# Patient Record
Sex: FEMALE | Race: WHITE | NOT HISPANIC OR LATINO | Employment: OTHER
[De-identification: names, ages, dates, MRNs, and addresses within clinical notes are randomized per-mention and may not be internally consistent; named-entity substitution may affect disease eponyms.]

---

## 2017-01-27 DIAGNOSIS — E78.5 HYPERLIPIDEMIA LDL GOAL <100: Primary | ICD-10-CM

## 2017-01-27 NOTE — TELEPHONE ENCOUNTER
Atorvastatin         Last Written Prescription Date: 10/27/16  Last Fill Quantity: 90, # refills: 0    Last Office Visit with G, P or [facility] prescribing provider:  08/02/16   Future Office Visit:      BP Readings from Last 3 Encounters:   10/04/16 124/67   08/19/16 135/82   08/02/16 138/73     ALT       19   8/8/2013  CHOL      203   8/2/2016  HDL       54   8/2/2016  LDL      107   8/2/2016  TRIG      209   8/2/2016  CHOLHDLRATIO      3.8   8/7/2015

## 2017-01-30 RX ORDER — ATORVASTATIN CALCIUM 40 MG/1
40 TABLET, FILM COATED ORAL DAILY
Qty: 90 TABLET | Refills: 0 | Status: SHIPPED | OUTPATIENT
Start: 2017-01-30 | End: 2022-03-24

## 2017-01-30 NOTE — TELEPHONE ENCOUNTER
Read by Lina Hendrix at 8/20/2016  9:24 AM      [place]   Thyroid is normal, lipids too high. Recheck in 2 months after starting lipitor or crestor.   If you have question, please send me a [employer] message or call my care team [phone].  Thanks, YESSY STOREY MD.     Routing refill request to provider for review/approval because:  Labs out of range:  LDL    Thank you  Eva GRACIA RN

## 2017-09-16 ENCOUNTER — HEALTH MAINTENANCE LETTER (OUTPATIENT)
Age: 82
End: 2017-09-16

## 2019-11-07 ENCOUNTER — HEALTH MAINTENANCE LETTER (OUTPATIENT)
Age: 84
End: 2019-11-07

## 2020-02-17 ENCOUNTER — HEALTH MAINTENANCE LETTER (OUTPATIENT)
Age: 85
End: 2020-02-17

## 2020-11-29 ENCOUNTER — HEALTH MAINTENANCE LETTER (OUTPATIENT)
Age: 85
End: 2020-11-29

## 2021-04-10 ENCOUNTER — HEALTH MAINTENANCE LETTER (OUTPATIENT)
Age: 86
End: 2021-04-10

## 2021-05-24 ENCOUNTER — TRANSFERRED RECORDS (OUTPATIENT)
Dept: HEALTH INFORMATION MANAGEMENT | Facility: CLINIC | Age: 86
End: 2021-05-24
Payer: MEDICARE

## 2021-09-25 ENCOUNTER — HEALTH MAINTENANCE LETTER (OUTPATIENT)
Age: 86
End: 2021-09-25

## 2021-09-28 ENCOUNTER — TRANSFERRED RECORDS (OUTPATIENT)
Dept: HEALTH INFORMATION MANAGEMENT | Facility: CLINIC | Age: 86
End: 2021-09-28
Payer: MEDICARE

## 2021-10-05 ENCOUNTER — TRANSFERRED RECORDS (OUTPATIENT)
Dept: HEALTH INFORMATION MANAGEMENT | Facility: CLINIC | Age: 86
End: 2021-10-05
Payer: MEDICARE

## 2022-03-24 ENCOUNTER — OFFICE VISIT (OUTPATIENT)
Dept: FAMILY MEDICINE | Facility: CLINIC | Age: 87
End: 2022-03-24
Payer: COMMERCIAL

## 2022-03-24 VITALS
HEART RATE: 75 BPM | HEIGHT: 66 IN | DIASTOLIC BLOOD PRESSURE: 86 MMHG | BODY MASS INDEX: 29.41 KG/M2 | WEIGHT: 183 LBS | OXYGEN SATURATION: 98 % | SYSTOLIC BLOOD PRESSURE: 120 MMHG | RESPIRATION RATE: 16 BRPM | TEMPERATURE: 97.2 F

## 2022-03-24 DIAGNOSIS — I10 ESSENTIAL HYPERTENSION WITH GOAL BLOOD PRESSURE LESS THAN 140/90: ICD-10-CM

## 2022-03-24 DIAGNOSIS — N18.31 STAGE 3A CHRONIC KIDNEY DISEASE (H): ICD-10-CM

## 2022-03-24 DIAGNOSIS — Z12.11 SCREEN FOR COLON CANCER: ICD-10-CM

## 2022-03-24 DIAGNOSIS — E03.9 ACQUIRED HYPOTHYROIDISM: ICD-10-CM

## 2022-03-24 DIAGNOSIS — E78.5 HYPERLIPIDEMIA LDL GOAL <100: ICD-10-CM

## 2022-03-24 DIAGNOSIS — F51.02 ADJUSTMENT INSOMNIA: Primary | ICD-10-CM

## 2022-03-24 PROCEDURE — 99204 OFFICE O/P NEW MOD 45 MIN: CPT | Performed by: FAMILY MEDICINE

## 2022-03-24 RX ORDER — LEVOTHYROXINE SODIUM 88 UG/1
88 TABLET ORAL DAILY
Qty: 90 TABLET | Refills: 4 | Status: SHIPPED | OUTPATIENT
Start: 2022-03-24 | End: 2022-11-04

## 2022-03-24 RX ORDER — TRAZODONE HYDROCHLORIDE 50 MG/1
50-100 TABLET, FILM COATED ORAL
Qty: 90 TABLET | Status: SHIPPED | OUTPATIENT
Start: 2022-03-24 | End: 2022-11-04

## 2022-03-24 RX ORDER — HYDROCHLOROTHIAZIDE 25 MG/1
25 TABLET ORAL EVERY MORNING
Qty: 90 TABLET | Refills: 4 | Status: SHIPPED | OUTPATIENT
Start: 2022-03-24 | End: 2022-11-04

## 2022-03-24 RX ORDER — ATORVASTATIN CALCIUM 40 MG/1
40 TABLET, FILM COATED ORAL DAILY
Qty: 90 TABLET | Refills: 4 | Status: SHIPPED | OUTPATIENT
Start: 2022-03-24 | End: 2022-11-04

## 2022-03-24 RX ORDER — ATENOLOL 25 MG/1
25 TABLET ORAL DAILY
Qty: 90 TABLET | Refills: 4 | Status: SHIPPED | OUTPATIENT
Start: 2022-03-24 | End: 2022-11-04

## 2022-03-24 ASSESSMENT — PATIENT HEALTH QUESTIONNAIRE - PHQ9
SUM OF ALL RESPONSES TO PHQ QUESTIONS 1-9: 11
SUM OF ALL RESPONSES TO PHQ QUESTIONS 1-9: 11
10. IF YOU CHECKED OFF ANY PROBLEMS, HOW DIFFICULT HAVE THESE PROBLEMS MADE IT FOR YOU TO DO YOUR WORK, TAKE CARE OF THINGS AT HOME, OR GET ALONG WITH OTHER PEOPLE: SOMEWHAT DIFFICULT

## 2022-03-24 ASSESSMENT — ANXIETY QUESTIONNAIRES
5. BEING SO RESTLESS THAT IT IS HARD TO SIT STILL: SEVERAL DAYS
3. WORRYING TOO MUCH ABOUT DIFFERENT THINGS: MORE THAN HALF THE DAYS
7. FEELING AFRAID AS IF SOMETHING AWFUL MIGHT HAPPEN: NOT AT ALL
7. FEELING AFRAID AS IF SOMETHING AWFUL MIGHT HAPPEN: NOT AT ALL
GAD7 TOTAL SCORE: 10
4. TROUBLE RELAXING: MORE THAN HALF THE DAYS
GAD7 TOTAL SCORE: 10
1. FEELING NERVOUS, ANXIOUS, OR ON EDGE: MORE THAN HALF THE DAYS
2. NOT BEING ABLE TO STOP OR CONTROL WORRYING: MORE THAN HALF THE DAYS
GAD7 TOTAL SCORE: 10
6. BECOMING EASILY ANNOYED OR IRRITABLE: SEVERAL DAYS

## 2022-03-24 ASSESSMENT — PAIN SCALES - GENERAL: PAINLEVEL: NO PAIN (0)

## 2022-03-24 NOTE — NURSING NOTE
"Initial /86   Pulse 75   Temp 97.2  F (36.2  C) (Tympanic)   Resp 16   Ht 1.664 m (5' 5.5\")   Wt 83 kg (183 lb)   SpO2 98%   BMI 29.99 kg/m   Estimated body mass index is 29.99 kg/m  as calculated from the following:    Height as of this encounter: 1.664 m (5' 5.5\").    Weight as of this encounter: 83 kg (183 lb). .      "

## 2022-03-24 NOTE — PROGRESS NOTES
"  Assessment & Plan     Adjustment insomnia  Exacerbated by the sudden death of her daughter due to a brain aneurysm.  She had been using Xanax prescribed by a [place] doctor but she would prefer not to.  Several years ago she was on trazodone and that worked well for her she is wondering about going back to this.  I think is a very reasonable option.  - traZODone (DESYREL) 50 MG tablet; Take 1-2 tablets ( mg) by mouth nightly as needed for sleep    Hypothyroidism  Well controlled. Refilled medication.   Labs up-to-date.  She will bring in her labs from [place] so that we can transfer those to her chart.  - levothyroxine (SYNTHROID/LEVOTHROID) 88 MCG tablet; Take 1 tablet (88 mcg) by mouth daily    Essential hypertension with goal blood pressure less than 140/90  Well controlled. Refilled medication.   Labs up-to-date.  She will bring in her labs from [place] so that we can transfer those to her chart.  - hydrochlorothiazide (HYDRODIURIL) 25 MG tablet; Take 1 tablet (25 mg) by mouth every morning  - atenolol (TENORMIN) 25 MG tablet; Take 1 tablet (25 mg) by mouth daily    Hyperlipidemia  Well controlled. Refilled medication.   Labs up-to-date.  She will bring in her labs from [place] so that we can transfer those to her chart.  - atorvastatin (LIPITOR) 40 MG tablet; Take 1 tablet (40 mg) by mouth daily    Stage 3a chronic kidney disease (H)  Monitor labs.    Screen for colon cancer               BMI:   Estimated body mass index is 29.99 kg/m  as calculated from the following:    Height as of this encounter: 1.664 m (5' 5.5\").    Weight as of this encounter: 83 kg (183 lb).           Return in about 1 year (around 3/24/2023) for Physical.    Adrienne Tejeda MD  [facility]    Amara Mills is a 87 year old who presents for the following health issues     History of Present Illness       CKD: She is not using over the counter pain medicine.     Mental Health Follow-up:  " "Patient presents to follow-up on Anxiety.    Patient's anxiety since last visit has been:  No change  The patient is having other symptoms associated with anxiety.  Any significant life events: grief or loss  Patient is feeling anxious or having panic attacks.  Patient has no concerns about alcohol or drug use.       Today's PHQ-9         PHQ-9 Total Score: 11  PHQ-9 Q9 Thoughts of better off dead/self-harm past 2 weeks :   (P) Not at all    How difficult have these problems made it for you to do your work, take care of things at home, or get along with other people: Somewhat difficult    Today's JEAN MARIE-7 Score: 10    Hyperlipidemia:  She presents for follow up of hyperlipidemia.  She is taking medication to lower cholesterol. She is not having myalgia or other side effects to statin medications.    Hypertension: She presents for follow up of hypertension.  She does check blood pressure  regularly outside of the clinic. Outpatient blood pressures have not been over 140/90. She follows a low salt diet.     Hypothyroidism:     Since last visit, patient describes the following symptoms::  Anxiety and Constipation    She eats 4 or more servings of fruits and vegetables daily.She consumes 0 sweetened beverage(s) daily.She exercises with enough effort to increase her heart rate 30 to 60 minutes per day.  She exercises with enough effort to increase her heart rate 6 days per week.   She is taking medications regularly.             Review of Systems   Constitutional, neuro, ENT, endocrine, pulmonary, cardiac, gastrointestinal, genitourinary, musculoskeletal, integument and psychiatric systems are negative, except as otherwise noted.       Objective    /86   Pulse 75   Temp 97.2  F (36.2  C) (Tympanic)   Resp 16   Ht 1.664 m (5' 5.5\")   Wt 83 kg (183 lb)   SpO2 98%   BMI 29.99 kg/m    Body mass index is 29.99 kg/m .  Physical Exam   GENERAL: Pleasant, well appearing female.            "

## 2022-03-25 ASSESSMENT — PATIENT HEALTH QUESTIONNAIRE - PHQ9: SUM OF ALL RESPONSES TO PHQ QUESTIONS 1-9: 11

## 2022-03-25 ASSESSMENT — ANXIETY QUESTIONNAIRES: GAD7 TOTAL SCORE: 10

## 2022-04-04 ENCOUNTER — DOCUMENTATION ONLY (OUTPATIENT)
Dept: OTHER | Facility: CLINIC | Age: 87
End: 2022-04-04
Payer: COMMERCIAL

## 2022-05-01 ENCOUNTER — HEALTH MAINTENANCE LETTER (OUTPATIENT)
Age: 87
End: 2022-05-01

## 2022-08-03 ENCOUNTER — HOSPITAL ENCOUNTER (EMERGENCY)
Facility: CLINIC | Age: 87
Discharge: HOME | End: 2022-08-03
Attending: PHYSICIAN ASSISTANT | Admitting: PHYSICIAN ASSISTANT
Payer: COMMERCIAL

## 2022-08-03 VITALS
TEMPERATURE: 99.3 F | WEIGHT: 180 LBS | SYSTOLIC BLOOD PRESSURE: 156 MMHG | RESPIRATION RATE: 18 BRPM | OXYGEN SATURATION: 96 % | HEART RATE: 80 BPM | DIASTOLIC BLOOD PRESSURE: 88 MMHG | BODY MASS INDEX: 29.5 KG/M2

## 2022-08-03 DIAGNOSIS — R10.9 RIGHT FLANK PAIN: ICD-10-CM

## 2022-08-03 DIAGNOSIS — R35.0 URINARY FREQUENCY: ICD-10-CM

## 2022-08-03 DIAGNOSIS — R82.90 ABNORMAL FINDING ON URINALYSIS: ICD-10-CM

## 2022-08-03 DIAGNOSIS — Z87.448 HISTORY OF KIDNEY DISEASE: ICD-10-CM

## 2022-08-03 LAB
ALBUMIN SERPL-MCNC: 4.2 G/DL (ref 3.4–5)
ALBUMIN UR-MCNC: NEGATIVE MG/DL
ALP SERPL-CCNC: 67 U/L (ref 40–150)
ALT SERPL W P-5'-P-CCNC: 22 U/L (ref 0–50)
ANION GAP SERPL CALCULATED.3IONS-SCNC: 6 MMOL/L (ref 3–14)
APPEARANCE UR: CLEAR
AST SERPL W P-5'-P-CCNC: 17 U/L (ref 0–45)
BASOPHILS # BLD AUTO: 0 10E3/UL (ref 0–0.2)
BASOPHILS NFR BLD AUTO: 1 %
BILIRUB SERPL-MCNC: 0.4 MG/DL (ref 0.2–1.3)
BILIRUB UR QL STRIP: NEGATIVE
BUN SERPL-MCNC: 19 MG/DL (ref 7–30)
CALCIUM SERPL-MCNC: 9.2 MG/DL (ref 8.5–10.1)
CHLORIDE BLD-SCNC: 101 MMOL/L (ref 94–109)
CO2 SERPL-SCNC: 27 MMOL/L (ref 20–32)
COLOR UR AUTO: ABNORMAL
CREAT SERPL-MCNC: 0.94 MG/DL (ref 0.52–1.04)
EOSINOPHIL # BLD AUTO: 0.2 10E3/UL (ref 0–0.7)
EOSINOPHIL NFR BLD AUTO: 3 %
ERYTHROCYTE [DISTWIDTH] IN BLOOD BY AUTOMATED COUNT: 12.4 % (ref 10–15)
GFR SERPL CREATININE-BSD FRML MDRD: 59 ML/MIN/1.73M2
GLUCOSE BLD-MCNC: 104 MG/DL (ref 70–99)
GLUCOSE UR STRIP-MCNC: NEGATIVE MG/DL
HCT VFR BLD AUTO: 37.5 % (ref 35–47)
HGB BLD-MCNC: 13 G/DL (ref 11.7–15.7)
HGB UR QL STRIP: ABNORMAL
HOLD SPECIMEN: NORMAL
HOLD SPECIMEN: NORMAL
IMM GRANULOCYTES # BLD: 0 10E3/UL
IMM GRANULOCYTES NFR BLD: 0 %
KETONES UR STRIP-MCNC: NEGATIVE MG/DL
LEUKOCYTE ESTERASE UR QL STRIP: NEGATIVE
LYMPHOCYTES # BLD AUTO: 1.4 10E3/UL (ref 0.8–5.3)
LYMPHOCYTES NFR BLD AUTO: 18 %
MCH RBC QN AUTO: 31.3 PG (ref 26.5–33)
MCHC RBC AUTO-ENTMCNC: 34.7 G/DL (ref 31.5–36.5)
MCV RBC AUTO: 90 FL (ref 78–100)
MONOCYTES # BLD AUTO: 0.6 10E3/UL (ref 0–1.3)
MONOCYTES NFR BLD AUTO: 8 %
MUCOUS THREADS #/AREA URNS LPF: PRESENT /LPF
NEUTROPHILS # BLD AUTO: 5.6 10E3/UL (ref 1.6–8.3)
NEUTROPHILS NFR BLD AUTO: 70 %
NITRATE UR QL: NEGATIVE
NRBC # BLD AUTO: 0 10E3/UL
NRBC BLD AUTO-RTO: 0 /100
PH UR STRIP: 6 [PH] (ref 5–7)
PLATELET # BLD AUTO: 254 10E3/UL (ref 150–450)
POTASSIUM BLD-SCNC: 3.8 MMOL/L (ref 3.4–5.3)
PROT SERPL-MCNC: 7.3 G/DL (ref 6.8–8.8)
RBC # BLD AUTO: 4.16 10E6/UL (ref 3.8–5.2)
RBC URINE: 1 /HPF
SODIUM SERPL-SCNC: 134 MMOL/L (ref 133–144)
SP GR UR STRIP: 1.01 (ref 1–1.03)
SQUAMOUS EPITHELIAL: <1 /HPF
UROBILINOGEN UR STRIP-MCNC: NORMAL MG/DL
WBC # BLD AUTO: 7.9 10E3/UL (ref 4–11)
WBC URINE: 7 /HPF

## 2022-08-03 PROCEDURE — G0463 HOSPITAL OUTPT CLINIC VISIT: HCPCS | Performed by: PHYSICIAN ASSISTANT

## 2022-08-03 PROCEDURE — 36415 COLL VENOUS BLD VENIPUNCTURE: CPT | Performed by: PHYSICIAN ASSISTANT

## 2022-08-03 PROCEDURE — 87086 URINE CULTURE/COLONY COUNT: CPT | Performed by: PHYSICIAN ASSISTANT

## 2022-08-03 PROCEDURE — 81001 URINALYSIS AUTO W/SCOPE: CPT | Performed by: PHYSICIAN ASSISTANT

## 2022-08-03 PROCEDURE — 85014 HEMATOCRIT: CPT | Performed by: PHYSICIAN ASSISTANT

## 2022-08-03 PROCEDURE — 99214 OFFICE O/P EST MOD 30 MIN: CPT | Performed by: PHYSICIAN ASSISTANT

## 2022-08-03 PROCEDURE — 80053 COMPREHEN METABOLIC PANEL: CPT | Performed by: PHYSICIAN ASSISTANT

## 2022-08-03 RX ORDER — CEFDINIR 300 MG/1
300 CAPSULE ORAL 2 TIMES DAILY
Qty: 14 CAPSULE | Refills: 0 | Status: SHIPPED | OUTPATIENT
Start: 2022-08-03 | End: 2022-08-10

## 2022-08-03 ASSESSMENT — ENCOUNTER SYMPTOMS
RESPIRATORY NEGATIVE: 1
NECK PAIN: 0
FREQUENCY: 1
FLANK PAIN: 1
VOMITING: 0
DIARRHEA: 0
FATIGUE: 0
CARDIOVASCULAR NEGATIVE: 1
NEUROLOGICAL NEGATIVE: 1
FEVER: 0
NAUSEA: 0
NECK STIFFNESS: 0
CONSTITUTIONAL NEGATIVE: 1
BLOOD IN STOOL: 0

## 2022-08-03 NOTE — DISCHARGE INSTRUCTIONS
Increase fluids rest, Tylenol over-the-counter as needed for symptoms.      Antibiotic to use as directed for the next 7 days.    Culture sent and currently pending.    Discussed options of outpatient imaging versus following up with the emergency department if symptoms persist or fail to improve in the next couple of days with inpatient labs and possible imaging if needed for further evaluation management if the initial antibiotic treatment does not work.    Return to the emergency department if persistent pain, chills, confusion, fevers, blood in the urine, worsening abdominal pain, change or worsening of symptoms occur or no improvement of symptoms in the next 2 days.    If symptoms are improving recommend recheck with primary care doctor in 5-7 days.

## 2022-08-03 NOTE — ED PROVIDER NOTES
History     Chief Complaint   Patient presents with     Back Pain     Pt is having right lower back pain that radiates.it started yesterday. Unable to sleep because of the pain. Pt has hx of having cysts on kidneys. Pt also reports urinary frequency. s     KAREN Hendrix is a 87 year old female with history of hypertension, stage IIIa chronic kidney disease who presents today with right back/flank pain that wraps around to the groin. Patient states symptoms started yesterday with urinary frequency and urgency today.  She states she was exposed she had a kidney cyst on image but that it was benign and they were just following treatments and that this may be related to symptoms today.  She denies any injury, falls, trauma dysuria.  She states pain is worse with movement.  She denies any rash, fevers, chills, shortness of breath, chest pain, heart racing or skipping beats, hematuria, dysuria, bloody or black tarry stools, or abdominal bloating.  She states she had a normal bowel movement today.  She has been eating and drinking normally.  She has been taking Tylenol with no improvement of symptoms.     Allergies:  Allergies   Allergen Reactions     Bee Venom        Problem List:    Patient Active Problem List    Diagnosis Date Noted     Essential hypertension with goal blood pressure less than 140/90 01/02/2006     Priority: High     switched from Timolol to Atenolol 1/2/06  Borderline GFR [place] filtration rate of kidneys   Letter from [facility] re screening - see scanned copy GFR [place] filtration rate of kidneys of 60 creat 0.93 identifying her as chronic kidney disease stage 2 - will cont to monitor for change  Problem list name updated by automated process. Provider to review       Stage 3a chronic kidney disease (H) 03/24/2022     Priority: Medium     Adjustment insomnia 03/24/2022     Priority: Medium     Advance care planning 08/18/2014     Priority: Medium     Advance  Care Planning:   Receipt of ACP document:  Received: Health Care Directive which was witnessed or notarized on 11/29/2013.  Document not previously scanned.  Validation form completed and sent with document to be scanned.  Code Status reflects choices in most recent ACP document.  Confirmed/documented designated decision maker(s). See permanent comments section of demographics in clinical tab. View document(s) and details by clicking on code status.   Added by Toshia Pool on 8/18/2014.             Osteopenia 08/08/2013     Priority: Medium     History of anxiety 01/31/2013     Priority: Medium     SK (seborrheic keratosis) 12/11/2012     Priority: Medium     Lentigo 12/11/2012     Priority: Medium     History of skin cancer 12/11/2012     Priority: Medium     Health Care Home 04/19/2011     Priority: Medium     High priority patient - 4/19/11      DX V65.8 REPLACED WITH [facility] C[facility] HOME (04/08/2013)       Hyperlipidemia LDL goal <100 10/31/2010     Priority: Medium     Vaginal foreign body 06/14/2010     Priority: Medium     Granulation tissue cauterized along posterior vault 5/10; when granulation receded, a small 3mm area of mesh was visible exposed along post vault; pt asymptomatic--no pain, bleeding or drainage; no tx needed unless symptomatic       S/P hysterectomy 04/27/2010     Priority: Medium     Acquired hypothyroidism 03/19/2008     Priority: Medium     Allergic rhinitis due to other allergen 12/27/2007     Priority: Medium     Esophageal reflux 12/27/2007     Priority: Medium     Goiter 11/29/2007     Priority: Medium     November 29, 2007   THYROID ultrasound   IMPRESSION:  1. 2.0 cm complex cystic nodule the right side of the thyroid isthmus.  2. Two tiny less than 0.5 cm diameter possible nodules in the inferior  right lobe of the thyroid gland.   Hemithyroidectomy 1/3/08  Problem list name updated by automated process. Provider to review       Dyspnea and respiratory abnormality 11/26/2007      Priority: Medium     Pulmonarty testing  methacholine challenge -- no significant response 9/07  Problem list name updated by automated process. Provider to review       SCREENING MAL NEOP-COLON 07/11/2007     Priority: Medium     7/07  Impression:     - Diverticulosis. There was no evidence of diverticular                  bleeding.  Recommendation: - Discharge patient to home (ambulatory).                 - Augmented water consumption.                 - High fiber diet for the rest of the patient's life.                 - Use fiber, for example Citrucel, Fibercon, Konsyl or                  Metamucil                 - Repeat colonoscopy in 10 years for screening purposes.                 - Patient was given an informational booklet on                  diverticulosis/diverticulitis.                                                                                         Breast disorder 01/15/2007     Priority: Medium     Breast reduction 1/06 has residual scar and rubbery 1 cm nodule at 6:00 right breast   Problem list name updated by automated process. Provider to review       Rectocele 08/31/2006     Priority: Medium     Postmenopausal atrophic vaginitis 01/02/2006     Priority: Medium     started premarin vag cream 1/4 deandra @ HS twice a week Advised by GEORGES Crawford to use 1 Gm twice a week to prevent breakdownn of tissue over  nylon mesh from colopexy          Past Medical History:    Past Medical History:   Diagnosis Date     Granulation tissue at vaginal vault 5/12/2010     Mixed hyperlipidemia      Squamous cell carcinoma      Unspecified essential hypertension        Past Surgical History:    Past Surgical History:   Procedure Laterality Date     COLONOSCOPY  7/10/07     RECTOCELE REPAIR  9/5/06    with prolift and colpopexy     SURGICAL HISTORY OF -       2001 cystocele and rectocele, birch procedure     SURGICAL HISTORY OF -   1/06    breast reduction     SURGICAL HISTORY OF -   1/3/08     Hemithyroidectomy Rt thyroid benign nodule     ZZC APPENDECTOMY      1958     ZZC VAGINAL HYSTERECTOMY      1997 total       Family History:    Family History   Problem Relation Age of Onset     Hypertension Mother      Lipids Mother      Cerebrovascular Disease Mother      Heart Disease Father         CHF @ age 99     Cancer Brother         brain - primary       Social History:  Marital Status:  Single [1]  Social History     Tobacco Use     Smoking status: Never Smoker     Smokeless tobacco: Never Used   Substance Use Topics     Alcohol use: No     Drug use: No        Medications:    cefdinir (OMNICEF) 300 MG capsule  ASPIRIN 81 MG PO TABS  atenolol (TENORMIN) 25 MG tablet  atorvastatin (LIPITOR) 40 MG tablet  GLUCOSAMINE CHONDRO COMPLEX OR  hydrochlorothiazide (HYDRODIURIL) 25 MG tablet  levothyroxine (SYNTHROID/LEVOTHROID) 88 MCG tablet  MULTIVITAMINS OR  STOOL SOFTENER OR  traZODone (DESYREL) 50 MG tablet          Review of Systems   Constitutional: Negative.  Negative for fatigue and fever.   HENT: Negative.    Respiratory: Negative.    Cardiovascular: Negative.    Gastrointestinal: Negative for blood in stool, diarrhea, nausea and vomiting.   Genitourinary: Positive for flank pain, frequency and urgency. Negative for vaginal bleeding, vaginal discharge and vaginal pain.   Musculoskeletal: Negative for neck pain and neck stiffness.        Right back pain that wraps to right groin.    Skin: Negative.  Negative for rash.   Neurological: Negative.        Physical Exam   BP: (!) 156/88  Pulse: 80  Temp: 99.3  F (37.4  C)  Resp: 18  Weight: 81.6 kg (180 lb)  SpO2: 96 %      Physical Exam  Vitals and nursing note reviewed.   Constitutional:       General: She is not in acute distress.     Appearance: Normal appearance. She is normal weight. She is not ill-appearing, toxic-appearing or diaphoretic.   Eyes:      General: No scleral icterus.     Extraocular Movements: Extraocular movements intact.       Conjunctiva/sclera: Conjunctivae normal.      Pupils: Pupils are equal, round, and reactive to light.   Cardiovascular:      Rate and Rhythm: Normal rate and regular rhythm.      Heart sounds: Normal heart sounds.   Pulmonary:      Effort: Pulmonary effort is normal.      Breath sounds: Normal breath sounds.   Abdominal:      General: Bowel sounds are normal. There is no distension.      Palpations: Abdomen is soft.      Tenderness: There is abdominal tenderness (right lower and suprapubic). There is no right CVA tenderness, left CVA tenderness, guarding or rebound.   Musculoskeletal:         General: No swelling, tenderness, deformity or signs of injury.      Cervical back: Normal range of motion and neck supple. No rigidity or tenderness.      Right lower leg: No edema.      Left lower leg: No edema.      Comments: Positive tenderness to palpation to the right lower back just inferior to the CVA region.  No edema, rash, warmth, swelling, or bruising noted.  Patient has full range of motion of the back but does complain of pain with range of motion.  No vertebral body tenderness or SI joint pain.  Patient has full range of motion in the lower extremities with 5 out of 5 bilateral lower extremity muscle strength, and is neurovascularly intact   Lymphadenopathy:      Cervical: No cervical adenopathy.   Skin:     General: Skin is warm.      Capillary Refill: Capillary refill takes less than 2 seconds.      Findings: No bruising, erythema or rash.   Neurological:      General: No focal deficit present.      Mental Status: She is alert and oriented to person, place, and time.      Sensory: No sensory deficit.      Motor: No weakness.      Gait: Gait normal.      Deep Tendon Reflexes: Reflexes normal.   Psychiatric:         Mood and Affect: Mood normal.         Behavior: Behavior normal.         Thought Content: Thought content normal.         Judgment: Judgment normal.         ED Course                 Procedures              Critical Care time:  none               Results for orders placed or performed during the hospital encounter of 08/03/22 (from the past 24 hour(s))   UA with Microscopic reflex to Culture    Specimen: Urine, Clean Catch   Result Value Ref Range    Color Urine Straw Colorless, Straw, Light Yellow, Yellow    Appearance Urine Clear Clear    Glucose Urine Negative Negative mg/dL    Bilirubin Urine Negative Negative    Ketones Urine Negative Negative mg/dL    Specific Gravity Urine 1.015 1.003 - 1.035    Blood Urine Trace (A) Negative    pH Urine 6.0 5.0 - 7.0    Protein Albumin Urine Negative Negative mg/dL    Urobilinogen Urine Normal Normal, 2.0 mg/dL    Nitrite Urine Negative Negative    Leukocyte Esterase Urine Negative Negative    Mucus Urine Present (A) None Seen /LPF    RBC Urine 1 <=2 /HPF    WBC Urine 7 (H) <=5 /HPF    Squamous Epithelials Urine <1 <=1 /HPF    Narrative    Urine Culture not indicated   CBC with platelets differential    Narrative    The following orders were created for panel order CBC with platelets differential.  Procedure                               Abnormality         Status                     ---------                               -----------         ------                     CBC with platelets and d...[[id number]]                      Final result                 Please view results for these tests on the individual orders.   Comprehensive metabolic panel   Result Value Ref Range    Sodium 134 133 - 144 mmol/L    Potassium 3.8 3.4 - 5.3 mmol/L    Chloride 101 94 - 109 mmol/L    Carbon Dioxide (CO2) 27 20 - 32 mmol/L    Anion Gap 6 3 - 14 mmol/L    Urea Nitrogen 19 7 - 30 mg/dL    Creatinine 0.94 0.52 - 1.04 mg/dL    Calcium 9.2 8.5 - 10.1 mg/dL    Glucose 104 (H) 70 - 99 mg/dL    Alkaline Phosphatase 67 40 - 150 U/L    AST 17 0 - 45 U/L    ALT 22 0 - 50 U/L    Protein Total 7.3 6.8 - 8.8 g/dL    Albumin 4.2 3.4 - 5.0 g/dL    Bilirubin Total 0.4 0.2 - 1.3 mg/dL    GFR Estimate 59  (L) >60 mL/min/1.73m2   CBC with platelets and differential   Result Value Ref Range    WBC Count 7.9 4.0 - 11.0 10e3/uL    RBC Count 4.16 3.80 - 5.20 10e6/uL    Hemoglobin 13.0 11.7 - 15.7 g/dL    Hematocrit 37.5 35.0 - 47.0 %    MCV 90 78 - 100 fL    MCH 31.3 26.5 - 33.0 pg    MCHC 34.7 31.5 - 36.5 g/dL    RDW 12.4 10.0 - 15.0 %    Platelet Count 254 150 - 450 10e3/uL    % Neutrophils 70 %    % Lymphocytes 18 %    % Monocytes 8 %    % Eosinophils 3 %    % Basophils 1 %    % Immature Granulocytes 0 %    NRBCs per 100 WBC 0 <1 /100    Absolute Neutrophils 5.6 1.6 - 8.3 10e3/uL    Absolute Lymphocytes 1.4 0.8 - 5.3 10e3/uL    Absolute Monocytes 0.6 0.0 - 1.3 10e3/uL    Absolute Eosinophils 0.2 0.0 - 0.7 10e3/uL    Absolute Basophils 0.0 0.0 - 0.2 10e3/uL    Absolute Immature Granulocytes 0.0 <=0.4 10e3/uL    Absolute NRBCs 0.0 10e3/uL   Extra Tube ([place] Draw)    Narrative    The following orders were created for panel order Extra Tube ([place] Draw).  Procedure                               Abnormality         Status                     ---------                               -----------         ------                     Extra Blue Top Tube[[id number]]                              In process                 Extra Red Top Tube[[id number]]                               In process                   Please view results for these tests on the individual orders.       Medications - No data to display    Assessments & Plan (with Medical Decision Making)     I have reviewed the nursing notes.    I have reviewed the findings, diagnosis, plan and need for follow up with the patient.    Lina Hendrix is a 87 year old female with history of hypertension, stage IIIa chronic kidney disease who presents today with right back/flank pain that wraps around to the groin. Patient states symptoms started yesterday with urinary frequency and urgency today.  She states she was exposed she had a kidney cyst on image but that it  was benign and they were just following treatments and that this may be related to symptoms today.  She denies any injury, falls, trauma dysuria.  She states pain is worse with movement.  She denies any rash, fevers, chills, shortness of breath, chest pain, heart racing or skipping beats, hematuria, dysuria, bloody or black tarry stools, or abdominal bloating.  She states she had a normal bowel movement today.  She has been eating and drinking normally.  She has been taking Tylenol with no improvement of symptoms.     See exam findings above today.  Vitals within normal limits other than slightly elevated blood pressure.  Patient is nontoxic and in no acute distress.  Patient does have full range of motion in able to ambulate and walk very easily without any issues.  No CVA tenderness noted no rash or concerns for herpes zoster.  She does have slight tenderness to the right lower back that wraps around to the right lower and suprapubic regions of the abdomen.  Abdomen is not rigid or distended and no significant guarding or rebound noted on exam.  UA obtained and shows trace blood, mucus present and 7 WBCs.  Urine culture sent and pending.  Discussed with patient that we could treat for possible early urinary tract infection while waiting for urine culture and patient can follow-up with change or worsening of symptoms occur or symptoms not improved within a couple of days.  I informed her that I cannot rule out ureterolithiasis, infected stone, acute abdomen, but I have low suspicion for acute abdomen at this time.  Offered and discussed with patient that she can go over to the emergency department for further evaluation with imaging and lab work today or I offered outpatient ultrasound of the kidneys or CT non contrast of abdomen pelvis that she can then follow-up with results with her primary care doctor.  Patient states she would like to try the antibiotics first and will definitely return to the emergency  department if symptoms worsen or change or fail to improve or follow-up with her primary care doctor.  Due to patient not having kidney functions done on file within the past 5 years CBC and comprehensive metabolic panel obtained which shows normal kidney function no elevated liver enzymes and normal CBC.  Patient informed of these results and patient sent home with cefdinir 1 tablet twice daily for 7 days.  Patient discharged in stable condition in no acute distress.    Differential diagnoses include muscle strain, lower back pain, urinary tract infection, pyelonephritis, ureterolithiasis, infected stone, or other causes of acute abdomen.  Lower suspicion for any vascular or arterial issues, but cannot completely rule this out either. No concerning findings for shingles at this time.     Discharge Medication List as of 8/3/2022  4:46 PM      START taking these medications    Details   cefdinir (OMNICEF) 300 MG capsule Take 1 capsule (300 mg) by mouth 2 times daily for 7 days, Disp-14 capsule, R-0, E-Prescribe             Final diagnoses:   Urinary frequency   Right flank pain   Abnormal finding on urinalysis   History of kidney disease       8/3/2022   [facility] EMERGENCY DEPT     Eli Montez PA-C  08/03/22 0526

## 2022-08-04 NOTE — RESULT ENCOUNTER NOTE
[facility] Emergency Dept discharge antibiotic (if prescribed): Cefdinir (Omnicef) 300 mg capsule, 1 capsule (300 mg) by mouth 2 times daily for 7 days   Date of Rx (if applicable):  8/3/22  No changes in treatment per [facility] ED Lab Result Urine culture protocol.

## 2022-08-05 LAB — BACTERIA UR CULT: NORMAL

## 2022-08-05 NOTE — RESULT ENCOUNTER NOTE
Final urine culture report is negative.  Adult Negative Urine culture parameters per protocol: Any # Urogenital single or mixed organism, <10,000 col/ml single organism (cath/midstream), and > 3 organisms (No susceptibilities performed).  [facility] Emergency Dept discharge antibiotic prescribed (If applicable): Cefdinir  Treatment recommendations per [facility] ED Lab Result Urine Culture protocol.

## 2022-10-30 ENCOUNTER — HOSPITAL ENCOUNTER (EMERGENCY)
Facility: CLINIC | Age: 87
Discharge: HOME | End: 2022-10-30
Attending: PHYSICIAN ASSISTANT | Admitting: PHYSICIAN ASSISTANT
Payer: COMMERCIAL

## 2022-10-30 VITALS
DIASTOLIC BLOOD PRESSURE: 88 MMHG | OXYGEN SATURATION: 98 % | HEART RATE: 84 BPM | RESPIRATION RATE: 18 BRPM | WEIGHT: 183 LBS | SYSTOLIC BLOOD PRESSURE: 189 MMHG | BODY MASS INDEX: 29.99 KG/M2 | TEMPERATURE: 98.2 F

## 2022-10-30 DIAGNOSIS — M54.6 LEFT-SIDED THORACIC BACK PAIN: ICD-10-CM

## 2022-10-30 DIAGNOSIS — R82.90 ABNORMAL URINALYSIS: ICD-10-CM

## 2022-10-30 LAB
ALBUMIN UR-MCNC: NEGATIVE MG/DL
APPEARANCE UR: CLEAR
BILIRUB UR QL STRIP: NEGATIVE
COLOR UR AUTO: YELLOW
GLUCOSE UR STRIP-MCNC: NEGATIVE MG/DL
HGB UR QL STRIP: ABNORMAL
HYALINE CASTS: 4 /LPF
KETONES UR STRIP-MCNC: NEGATIVE MG/DL
LEUKOCYTE ESTERASE UR QL STRIP: ABNORMAL
MUCOUS THREADS #/AREA URNS LPF: PRESENT /LPF
NITRATE UR QL: NEGATIVE
PH UR STRIP: 6 [PH] (ref 5–7)
RBC URINE: 4 /HPF
SP GR UR STRIP: 1.02 (ref 1–1.03)
SQUAMOUS EPITHELIAL: 1 /HPF
UROBILINOGEN UR STRIP-MCNC: NORMAL MG/DL
WBC URINE: 61 /HPF

## 2022-10-30 PROCEDURE — 87086 URINE CULTURE/COLONY COUNT: CPT | Performed by: PHYSICIAN ASSISTANT

## 2022-10-30 PROCEDURE — 99214 OFFICE O/P EST MOD 30 MIN: CPT | Performed by: PHYSICIAN ASSISTANT

## 2022-10-30 PROCEDURE — 81003 URINALYSIS AUTO W/O SCOPE: CPT | Performed by: PHYSICIAN ASSISTANT

## 2022-10-30 PROCEDURE — G0463 HOSPITAL OUTPT CLINIC VISIT: HCPCS | Performed by: PHYSICIAN ASSISTANT

## 2022-10-30 RX ORDER — HYDROCODONE BITARTRATE AND ACETAMINOPHEN 5; 325 MG/1; MG/1
1 TABLET ORAL EVERY 6 HOURS PRN
Qty: 10 TABLET | Refills: 0 | Status: SHIPPED | OUTPATIENT
Start: 2022-10-30 | End: 2022-11-02

## 2022-10-30 RX ORDER — CEFDINIR 300 MG/1
300 CAPSULE ORAL 2 TIMES DAILY
Qty: 14 CAPSULE | Refills: 0 | Status: SHIPPED | OUTPATIENT
Start: 2022-10-30 | End: 2022-11-06

## 2022-10-30 ASSESSMENT — ACTIVITIES OF DAILY LIVING (ADL): ADLS_ACUITY_SCORE: 35

## 2022-10-30 NOTE — ED PROVIDER NOTES
History     Chief Complaint   Patient presents with     Back Pain     HPI  Lina Hendrix is a 87 year old female past medical history significant for hypertension, hypothyroidism, osteopenia, chronic kidney disease who presents to the urgent care with concern over left-sided thoracic back pain which been present for the last 3 days.  Patient denies any instigating injury or trauma.  She describes pain as sharp, intense.  Unaware of any clear aggravating factors is minimally alleviated when she flexes forward.  She has had some associated urinary frequency, urgency.  She denies any dysuria, hematuria, dizziness, lightheadedness, cough, chest pain, dyspnea, wheezing, nausea, vomiting, abdominal pain.  No numbness or paresthesias in her extremities or saddle region or change in control of bowel or bladder function. She has attempted to treat with 440 mg of naproxen sodium and 500 mg of Tylenol this morning without relief.   She did have similar pain in the right thoracic region several months ago that felt similar, however less intense.     Allergies:  Allergies   Allergen Reactions     Bee Venom        Problem List:    Patient Active Problem List    Diagnosis Date Noted     Essential hypertension with goal blood pressure less than 140/90 01/02/2006     Priority: High     switched from Timolol to Atenolol 1/2/06  Borderline GFR [place] filtration rate of kidneys   Letter from [facility] re screening - see scanned copy GFR [place] filtration rate of kidneys of 60 creat 0.93 identifying her as chronic kidney disease stage 2 - will cont to monitor for change  Problem list name updated by automated process. Provider to review       Stage 3a chronic kidney disease (H) 03/24/2022     Priority: Medium     Adjustment insomnia 03/24/2022     Priority: Medium     Advance care planning 08/18/2014     Priority: Medium     Advance Care Planning:   Receipt of ACP document:  Received: [facility]  Directive which was witnessed or notarized on 11/29/2013.  Document not previously scanned.  Validation form completed and sent with document to be scanned.  Code Status reflects choices in most recent ACP document.  Confirmed/documented designated decision maker(s). See permanent comments section of demographics in clinical tab. View document(s) and details by clicking on code status.   Added by Toshia Pool on 8/18/2014.             Osteopenia 08/08/2013     Priority: Medium     History of anxiety 01/31/2013     Priority: Medium     SK (seborrheic keratosis) 12/11/2012     Priority: Medium     Lentigo 12/11/2012     Priority: Medium     History of skin cancer 12/11/2012     Priority: Medium     Health Care Home 04/19/2011     Priority: Medium     High priority patient - 4/19/11      DX V65.8 REPLACED WITH [id number] [facility]ALTH CARE HOME (04/08/2013)       Hyperlipidemia LDL goal <100 10/31/2010     Priority: Medium     Vaginal foreign body 06/14/2010     Priority: Medium     Granulation tissue cauterized along posterior vault 5/10; when granulation receded, a small 3mm area of mesh was visible exposed along post vault; pt asymptomatic--no pain, bleeding or drainage; no tx needed unless symptomatic       S/P hysterectomy 04/27/2010     Priority: Medium     Acquired hypothyroidism 03/19/2008     Priority: Medium     Allergic rhinitis due to other allergen 12/27/2007     Priority: Medium     Esophageal reflux 12/27/2007     Priority: Medium     Goiter 11/29/2007     Priority: Medium     November 29, 2007   THYROID ultrasound   IMPRESSION:  1. 2.0 cm complex cystic nodule the right side of the thyroid isthmus.  2. Two tiny less than 0.5 cm diameter possible nodules in the inferior  right lobe of the thyroid gland.   Hemithyroidectomy 1/3/08  Problem list name updated by automated process. Provider to review       Dyspnea and respiratory abnormality 11/26/2007     Priority: Medium     Pulmonarty testing  methacholine  challenge -- no significant response 9/07  Problem list name updated by automated process. Provider to review       SCREENING MAL NEOP-COLON 07/11/2007     Priority: Medium     7/07  Impression:     - Diverticulosis. There was no evidence of diverticular                  bleeding.  Recommendation: - Discharge patient to home (ambulatory).                 - Augmented water consumption.                 - High fiber diet for the rest of the patient's life.                 - Use fiber, for example Citrucel, Fibercon, Konsyl or                  Metamucil                 - Repeat colonoscopy in 10 years for screening purposes.                 - Patient was given an informational booklet on                  diverticulosis/diverticulitis.                                                                                         Breast disorder 01/15/2007     Priority: Medium     Breast reduction 1/06 has residual scar and rubbery 1 cm nodule at 6:00 right breast   Problem list name updated by automated process. Provider to review       Rectocele 08/31/2006     Priority: Medium     Postmenopausal atrophic vaginitis 01/02/2006     Priority: Medium     started premarin vag cream 1/4 deandra @ HS twice a week Advised by GEORGES Crawfodr to use 1 Gm twice a week to prevent breakdownn of tissue over  nylon mesh from colopexy          Past Medical History:    Past Medical History:   Diagnosis Date     Granulation tissue at vaginal vault 5/12/2010     Mixed hyperlipidemia      Squamous cell carcinoma      Unspecified essential hypertension        Past Surgical History:    Past Surgical History:   Procedure Laterality Date     COLONOSCOPY  7/10/07     RECTOCELE REPAIR  9/5/06    with prolift and colpopexy     SURGICAL HISTORY OF -       2001 cystocele and rectocele, birch procedure     SURGICAL HISTORY OF -   1/06    breast reduction     SURGICAL HISTORY OF -   1/3/08    Hemithyroidectomy Rt thyroid benign nodule     ZZC APPENDECTOMY       1958     [facility] VAGINAL HYSTERECTOMY      1997 total       Family History:    Family History   Problem Relation Age of Onset     Hypertension Mother      Lipids Mother      Cerebrovascular Disease Mother      Heart Disease Father         CHF @ age 99     Cancer Brother         brain - primary       Social History:  Marital Status:  Single [1]  Social History     Tobacco Use     Smoking status: Never     Smokeless tobacco: Never   Substance Use Topics     Alcohol use: No     Drug use: No        Medications:    ASPIRIN 81 MG PO TABS  atenolol (TENORMIN) 25 MG tablet  atorvastatin (LIPITOR) 40 MG tablet  GLUCOSAMINE CHONDRO COMPLEX OR  hydrochlorothiazide (HYDRODIURIL) 25 MG tablet  levothyroxine (SYNTHROID/LEVOTHROID) 88 MCG tablet  MULTIVITAMINS OR  STOOL SOFTENER OR  traZODone (DESYREL) 50 MG tablet      Review of Systems   Constitutional: Negative for chills and fever.   Eyes: Negative for visual disturbance.   Respiratory: Negative for cough, shortness of breath and wheezing.    Cardiovascular: Negative for chest pain and palpitations.   Gastrointestinal: Negative for abdominal pain, diarrhea, nausea and vomiting.   Genitourinary: Positive for frequency and urgency. Negative for dysuria and hematuria.   Musculoskeletal: Positive for back pain.   Skin: Negative for color change, rash and wound.   Neurological: Negative for weakness and numbness.     Physical Exam   BP: (!) 189/88  Pulse: 84  Temp: 98.2  F (36.8  C)  Resp: 18  Weight: 83 kg (183 lb)  SpO2: 98 %      Physical Exam  Constitutional:       Comments: Patient does appear in some pain   Cardiovascular:      Rate and Rhythm: Normal rate and regular rhythm.      Heart sounds: No murmur heard.    No friction rub. No gallop.   Pulmonary:      Effort: Pulmonary effort is normal. No respiratory distress.      Breath sounds: Normal breath sounds. No wheezing, rhonchi or rales.   Musculoskeletal:      Cervical back: Normal.      Thoracic back: No swelling,  deformity, spasms, tenderness or bony tenderness. Decreased range of motion.      Lumbar back: No swelling, deformity, lacerations or tenderness. Decreased range of motion.        Back:    Skin:     General: Skin is warm and dry.      Findings: No abrasion, ecchymosis, erythema, laceration or rash.   Neurological:      Mental Status: She is alert and oriented to person, place, and time.      GCS: GCS eye subscore is 4. GCS verbal subscore is 5. GCS motor subscore is 6.      Sensory: No sensory deficit.      Deep Tendon Reflexes:      Reflex Scores:       Patellar reflexes are 2+ on the right side and 2+ on the left side.        ED Course                 Procedures              Critical Care time:  none               Results for orders placed or performed during the hospital encounter of 10/30/22   UA with Microscopic reflex to Culture     Status: Abnormal    Specimen: Urine, Clean Catch   Result Value Ref Range    Color Urine Yellow Colorless, Straw, Light Yellow, Yellow    Appearance Urine Clear Clear    Glucose Urine Negative Negative mg/dL    Bilirubin Urine Negative Negative    Ketones Urine Negative Negative mg/dL    Specific Gravity Urine 1.020 1.003 - 1.035    Blood Urine Trace (A) Negative    pH Urine 6.0 5.0 - 7.0    Protein Albumin Urine Negative Negative mg/dL    Urobilinogen Urine Normal Normal, 2.0 mg/dL    Nitrite Urine Negative Negative    Leukocyte Esterase Urine Small (A) Negative    Mucus Urine Present (A) None Seen /LPF    RBC Urine 4 (H) <=2 /HPF    WBC Urine 61 (H) <=5 /HPF    Squamous Epithelials Urine 1 <=1 /HPF    Hyaline Casts Urine 4 (H) <=2 /LPF    Narrative    Urine Culture ordered based on laboratory criteria     Medications - No data to display    Assessments & Plan (with Medical Decision Making)     I have reviewed the nursing notes.    I have reviewed the findings, diagnosis, plan and need for follow up with the patient.       Discharge Medication List as of 10/30/2022  1:46 PM       START taking these medications    Details   cefdinir (OMNICEF) 300 MG capsule Take 1 capsule (300 mg) by mouth 2 times daily for 7 days, Disp-14 capsule, R-0, E-Prescribe      HYDROcodone-acetaminophen (NORCO) 5-325 MG tablet Take 1 tablet by mouth every 6 hours as needed for severe pain, Disp-10 tablet, R-0, E-Prescribe             Final diagnoses:   Left-sided thoracic back pain   Abnormal urinalysis     87-year-old female presents the urgent care with concern over left-sided thoracic back pain which is been present for the last 3 days without instigating injury or trauma.  She had elevated blood pressure upon arrival, remainder of vital signs stable.  Physical exam findings did show pain in the left thoracic region which was somewhat reproducible with movement.  No clear reproduction with palpation.  She did not have any significant CVA tenderness.  she did have urinalysis which did show some signs of infection with increased number of WBCs, small leukocyte esterase.  Did discuss potential for urine infection contributing to her discomfort.  It would also consider mechanical musculoskeletal back pain.  I did recommend patient be evaluated in the emergency department for further evaluation to rule out intra thoracic etiologies of her pain and she declined.  She was discharged home stable with prescription for Omnicef pending urine culture from today's visit and small number of Norco to be used as needed for breakthrough pain.  Follow-up with primary care provider within the next week as previously scheduled.  Worrisome reasons to return to the ER/UC sooner discussed.     Disclaimer: This note consists of symbols derived from keyboarding, dictation, and/or voice recognition software. As a result, there may be errors in the script that have gone undetected.  Please consider this when interpreting information found in the chart.      10/30/2022   [facility] EMERGENCY DEPT     Rylie Connor,  PETTY  11/02/22 0927

## 2022-10-30 NOTE — ED TRIAGE NOTES
Patient here with ongoing back pain, pressure. Has been going on for a couple of days and has increased. Patient state that she has taken naproxen and tylenol [id number]     Triage Assessment     Row Name 10/30/22 1229       Triage Assessment (Adult)    Airway WDL WDL       Respiratory WDL    Respiratory WDL WDL       Skin Circulation/Temperature WDL    Skin Circulation/Temperature WDL WDL

## 2022-10-31 NOTE — RESULT ENCOUNTER NOTE
[facility] Emergency Dept discharge antibiotic (if prescribed): Cefdinir (Omnicef) 300 mg capsule, 1 capsule (300 mg) by mouth 2 times daily for 7 days   Date of Rx (if applicable):  10/30/22  No changes in treatment per [facility] ED Lab Result Urine culture protocol.

## 2022-11-01 LAB — BACTERIA UR CULT: NORMAL

## 2022-11-01 NOTE — RESULT ENCOUNTER NOTE
Final urine culture report is negative.  Adult Negative Urine culture parameters per protocol: Any # Urogenital single or mixed organism, <10,000 col/ml single organism (cath/midstream), and > 3 organisms (No susceptibilities performed).  [facility] Emergency Dept discharge antibiotic prescribed (If applicable): Cefdinir  Treatment recommendations per [facility] ED Lab Result Urine Culture protocol.

## 2022-11-02 ASSESSMENT — ENCOUNTER SYMPTOMS
NAUSEA: 0
FREQUENCY: 1
PALPITATIONS: 0
DYSURIA: 0
WHEEZING: 0
SHORTNESS OF BREATH: 0
ABDOMINAL PAIN: 0
COUGH: 0
CHILLS: 0
COLOR CHANGE: 0
BACK PAIN: 1
WEAKNESS: 0
FEVER: 0
NUMBNESS: 0
DIARRHEA: 0
WOUND: 0
VOMITING: 0
HEMATURIA: 0

## 2022-11-04 ENCOUNTER — OFFICE VISIT (OUTPATIENT)
Dept: FAMILY MEDICINE | Facility: CLINIC | Age: 87
End: 2022-11-04
Payer: COMMERCIAL

## 2022-11-04 ENCOUNTER — TELEPHONE (OUTPATIENT)
Dept: FAMILY MEDICINE | Facility: CLINIC | Age: 87
End: 2022-11-04

## 2022-11-04 VITALS
HEIGHT: 66 IN | TEMPERATURE: 98.4 F | OXYGEN SATURATION: 98 % | RESPIRATION RATE: 16 BRPM | SYSTOLIC BLOOD PRESSURE: 134 MMHG | HEART RATE: 67 BPM | DIASTOLIC BLOOD PRESSURE: 74 MMHG | WEIGHT: 180 LBS | BODY MASS INDEX: 28.93 KG/M2

## 2022-11-04 DIAGNOSIS — I10 ESSENTIAL HYPERTENSION WITH GOAL BLOOD PRESSURE LESS THAN 140/90: ICD-10-CM

## 2022-11-04 DIAGNOSIS — E03.9 ACQUIRED HYPOTHYROIDISM: ICD-10-CM

## 2022-11-04 DIAGNOSIS — F51.02 ADJUSTMENT INSOMNIA: ICD-10-CM

## 2022-11-04 DIAGNOSIS — M15.9 OSTEOARTHRITIS OF MULTIPLE JOINTS, UNSPECIFIED OSTEOARTHRITIS TYPE: Primary | ICD-10-CM

## 2022-11-04 DIAGNOSIS — E78.5 HYPERLIPIDEMIA LDL GOAL <100: ICD-10-CM

## 2022-11-04 DIAGNOSIS — N18.31 STAGE 3A CHRONIC KIDNEY DISEASE (H): ICD-10-CM

## 2022-11-04 DIAGNOSIS — F41.9 ANXIETY: ICD-10-CM

## 2022-11-04 DIAGNOSIS — Z00.00 ENCOUNTER FOR MEDICARE ANNUAL WELLNESS EXAM: Primary | ICD-10-CM

## 2022-11-04 LAB
ANION GAP SERPL CALCULATED.3IONS-SCNC: 10 MMOL/L (ref 7–15)
BUN SERPL-MCNC: 15.7 MG/DL (ref 8–23)
CALCIUM SERPL-MCNC: 9.7 MG/DL (ref 8.8–10.2)
CHLORIDE SERPL-SCNC: 95 MMOL/L (ref 98–107)
CHOLEST SERPL-MCNC: 162 MG/DL
CREAT SERPL-MCNC: 1.02 MG/DL (ref 0.51–0.95)
CREAT UR-MCNC: 106.4 MG/DL
DEPRECATED HCO3 PLAS-SCNC: 27 MMOL/L (ref 22–29)
GFR SERPL CREATININE-BSD FRML MDRD: 54 ML/MIN/1.73M2
GLUCOSE SERPL-MCNC: 110 MG/DL (ref 70–99)
HDLC SERPL-MCNC: 63 MG/DL
LDLC SERPL CALC-MCNC: 70 MG/DL
MICROALBUMIN UR-MCNC: 17.1 MG/L
MICROALBUMIN/CREAT UR: 16.07 MG/G CR (ref 0–25)
NONHDLC SERPL-MCNC: 99 MG/DL
POTASSIUM SERPL-SCNC: 4.2 MMOL/L (ref 3.4–5.3)
SODIUM SERPL-SCNC: 132 MMOL/L (ref 136–145)
TRIGL SERPL-MCNC: 146 MG/DL
TSH SERPL DL<=0.005 MIU/L-ACNC: 3.48 UIU/ML (ref 0.3–4.2)

## 2022-11-04 PROCEDURE — G0439 PPPS, SUBSEQ VISIT: HCPCS | Performed by: FAMILY MEDICINE

## 2022-11-04 PROCEDURE — 82043 UR ALBUMIN QUANTITATIVE: CPT | Performed by: FAMILY MEDICINE

## 2022-11-04 PROCEDURE — 84443 ASSAY THYROID STIM HORMONE: CPT | Performed by: FAMILY MEDICINE

## 2022-11-04 PROCEDURE — 80048 BASIC METABOLIC PNL TOTAL CA: CPT | Performed by: FAMILY MEDICINE

## 2022-11-04 PROCEDURE — G0008 ADMIN INFLUENZA VIRUS VAC: HCPCS | Performed by: FAMILY MEDICINE

## 2022-11-04 PROCEDURE — 36415 COLL VENOUS BLD VENIPUNCTURE: CPT | Performed by: FAMILY MEDICINE

## 2022-11-04 PROCEDURE — 99214 OFFICE O/P EST MOD 30 MIN: CPT | Mod: 25 | Performed by: FAMILY MEDICINE

## 2022-11-04 PROCEDURE — 90662 IIV NO PRSV INCREASED AG IM: CPT | Performed by: FAMILY MEDICINE

## 2022-11-04 PROCEDURE — 80061 LIPID PANEL: CPT | Performed by: FAMILY MEDICINE

## 2022-11-04 RX ORDER — LEVOTHYROXINE SODIUM 88 UG/1
88 TABLET ORAL DAILY
Qty: 90 TABLET | Refills: 4 | Status: SHIPPED | OUTPATIENT
Start: 2022-11-04

## 2022-11-04 RX ORDER — CELECOXIB 100 MG/1
100 CAPSULE ORAL 2 TIMES DAILY
Qty: 60 CAPSULE | Refills: 1 | Status: SHIPPED | OUTPATIENT
Start: 2022-11-04 | End: 2022-12-26

## 2022-11-04 RX ORDER — DULOXETIN HYDROCHLORIDE 30 MG/1
30 CAPSULE, DELAYED RELEASE ORAL DAILY
Qty: 30 CAPSULE | Refills: 1 | Status: SHIPPED | OUTPATIENT
Start: 2022-11-04 | End: 2022-12-26

## 2022-11-04 RX ORDER — ATENOLOL 25 MG/1
25 TABLET ORAL DAILY
Qty: 90 TABLET | Refills: 4 | Status: SHIPPED | OUTPATIENT
Start: 2022-11-04

## 2022-11-04 RX ORDER — TRAZODONE HYDROCHLORIDE 50 MG/1
50-100 TABLET, FILM COATED ORAL
Qty: 90 TABLET | Status: SHIPPED | OUTPATIENT
Start: 2022-11-04 | End: 2023-03-24

## 2022-11-04 RX ORDER — HYDROCHLOROTHIAZIDE 25 MG/1
25 TABLET ORAL EVERY MORNING
Qty: 90 TABLET | Refills: 4 | Status: SHIPPED | OUTPATIENT
Start: 2022-11-04

## 2022-11-04 RX ORDER — ATORVASTATIN CALCIUM 40 MG/1
40 TABLET, FILM COATED ORAL DAILY
Qty: 90 TABLET | Refills: 4 | Status: SHIPPED | OUTPATIENT
Start: 2022-11-04

## 2022-11-04 ASSESSMENT — ENCOUNTER SYMPTOMS
FEVER: 0
SHORTNESS OF BREATH: 0
FREQUENCY: 1
HEMATURIA: 1
DIARRHEA: 0
NAUSEA: 0
EYE PAIN: 0
JOINT SWELLING: 0
ARTHRALGIAS: 1
COUGH: 0
HEADACHES: 0
DIZZINESS: 0
HEMATOCHEZIA: 0
PALPITATIONS: 0
CONSTIPATION: 1
PARESTHESIAS: 0
NERVOUS/ANXIOUS: 1
BREAST MASS: 0
WEAKNESS: 0
CHILLS: 0
ABDOMINAL PAIN: 0
SORE THROAT: 0
HEARTBURN: 0
MYALGIAS: 0

## 2022-11-04 ASSESSMENT — ACTIVITIES OF DAILY LIVING (ADL): CURRENT_FUNCTION: NO ASSISTANCE NEEDED

## 2022-11-04 ASSESSMENT — PAIN SCALES - GENERAL: PAINLEVEL: MODERATE PAIN (4)

## 2022-11-04 NOTE — NURSING NOTE
"Initial /74   Pulse 67   Temp 98.4  F (36.9  C) (Tympanic)   Resp 16   Ht 1.664 m (5' 5.5\")   Wt 81.6 kg (180 lb)   SpO2 98%   BMI 29.50 kg/m   Estimated body mass index is 29.5 kg/m  as calculated from the following:    Height as of this encounter: 1.664 m (5' 5.5\").    Weight as of this encounter: 81.6 kg (180 lb). .      "

## 2022-11-04 NOTE — PROGRESS NOTES
"SUBJECTIVE:   Lina is a 87 year old who presents for Preventive Visit.      Patient has been advised of split billing requirements and indicates understanding: Yes  Are you in the first 12 months of your Medicare coverage?  No    Healthy Habits:     In general, how would you rate your overall health?  Good    Frequency of exercise:  6-7 days/week    Duration of exercise:  15-30 minutes    Do you usually eat at least 4 servings of fruit and vegetables a day, include whole grains    & fiber and avoid regularly eating high fat or \"junk\" foods?  Yes    Taking medications regularly:  Yes    Medication side effects:  None    Ability to successfully perform activities of daily living:  No assistance needed    Home Safety:  No safety concerns identified    Hearing Impairment:  No hearing concerns    In the past 6 months, have you been bothered by leaking of urine?  No    In general, how would you rate your overall mental or emotional health?  Good      PHQ-2 Total Score: 2    Additional concerns today:  No    Do you feel safe in your environment? Yes    Have you ever done Advance Care Planning? (For example, a Health Directive, POLST, or a discussion with a medical provider or your loved ones about your wishes): Yes, advance care planning is on file.       Fall risk  Fallen 2 or more times in the past year?: No  Any fall with injury in the past year?: No    Cognitive Screening   1) Repeat 3 items (Leader, Season, Table)    2) Clock draw: NORMAL  3) 3 item recall: Recalls 3 objects  Results: 3 items recalled: COGNITIVE IMPAIRMENT LESS LIKELY    Mini-CogTM Copyright HERMELINDA Ku. Licensed by the author for use in [facility]; reprinted with permission (zoila@.[facility]). All rights reserved.      Do you have sleep apnea, excessive snoring or daytime drowsiness?: no    Reviewed and updated as needed this visit by clinical staff   Tobacco  Allergies    Med Hx  Surg Hx  Fam Hx  Soc Hx        Reviewed and updated as " needed this visit by Provider                 Social History     Tobacco Use     Smoking status: Never     Smokeless tobacco: Never   Substance Use Topics     Alcohol use: No         Alcohol Use 11/4/2022   Prescreen: >3 drinks/day or >7 drinks/week? No   Prescreen: >3 drinks/day or >7 drinks/week? -               Current providers sharing in care for this patient include:   Patient Care Team:  Adrienne Tejeda MD as PCP - General (Family Medicine)  Adrienne Tejeda MD as Assigned PCP  Kathy Vasquez PA-C as Physician Assistant (Dermatology)    The following health maintenance items are reviewed in Epic and correct as of today:  Health Maintenance   Topic Date Due     HEPATITIS B IMMUNIZATION (1 of 3 - 3-dose series) Never done     MICROALBUMIN  11/21/2008     MEDICARE ANNUAL WELLNESS VISIT  08/05/2015     ZOSTER IMMUNIZATION (2 of 3) 08/22/2016     LIPID  08/02/2017     TSH W/FREE T4 REFLEX  08/02/2017     INFLUENZA VACCINE (1) 09/01/2022     ANNUAL REVIEW OF HM ORDERS  03/24/2023     BMP  08/03/2023     HEMOGLOBIN  08/03/2023     FALL RISK ASSESSMENT  11/04/2023     DTAP/TDAP/TD IMMUNIZATION (3 - Td or Tdap) 08/02/2026     ADVANCE CARE PLANNING  04/04/2027     PHQ-2 (once per calendar year)  Completed     Pneumococcal Vaccine: 65+ Years  Completed     URINALYSIS  Completed     COVID-19 Vaccine  Completed     IPV IMMUNIZATION  Aged Out     MENINGITIS IMMUNIZATION  Aged Out     COLORECTAL CANCER SCREENING  Discontinued     Labs reviewed in EPIC  Patient Active Problem List   Diagnosis     Essential hypertension with goal blood pressure less than 140/90     Postmenopausal atrophic vaginitis     Rectocele     Breast disorder     SCREENING MAL NEOP-COLON     Dyspnea and respiratory abnormality     Goiter     Allergic rhinitis due to other allergen     Esophageal reflux     Acquired hypothyroidism     S/P hysterectomy     Vaginal foreign body     Hyperlipidemia LDL goal <100     Health Care Home      Anxiety     SK (seborrheic keratosis)     Lentigo     History of skin cancer     History of anxiety     Osteopenia     Advance care planning     Stage 3a chronic kidney disease (H)     Adjustment insomnia     Past Surgical History:   Procedure Laterality Date     COLONOSCOPY  7/10/07     RECTOCELE REPAIR  9/5/06    with prolift and colpopexy     SURGICAL HISTORY OF -       2001 cystocele and rectocele, birch procedure     SURGICAL HISTORY OF -   1/06    breast reduction     SURGICAL HISTORY OF -   1/3/08    Hemithyroidectomy Rt thyroid benign nodule     [facility] APPENDECTOMY      1958     [facility] VAGINAL HYSTERECTOMY      1997 total       Social History     Tobacco Use     Smoking status: Never     Smokeless tobacco: Never   Substance Use Topics     Alcohol use: No     Family History   Problem Relation Age of Onset     Hypertension Mother      Lipids Mother      Cerebrovascular Disease Mother      Heart Disease Father         CHF @ age 99     Cancer Brother         brain - primary         Current Outpatient Medications   Medication Sig Dispense Refill     ASPIRIN 81 MG PO TABS 1 TABLET DAILY       atenolol (TENORMIN) 25 MG tablet Take 1 tablet (25 mg) by mouth daily 90 tablet 4     atorvastatin (LIPITOR) 40 MG tablet Take 1 tablet (40 mg) by mouth daily 90 tablet 4     DULoxetine (CYMBALTA) 30 MG capsule Take 1 capsule (30 mg) by mouth daily 30 capsule 1     GLUCOSAMINE CHONDRO COMPLEX OR 1 tablet by mouth daily       hydrochlorothiazide (HYDRODIURIL) 25 MG tablet Take 1 tablet (25 mg) by mouth every morning 90 tablet 4     levothyroxine (SYNTHROID/LEVOTHROID) 88 MCG tablet Take 1 tablet (88 mcg) by mouth daily 90 tablet 4     MULTIVITAMINS OR 1 tab daily       STOOL SOFTENER OR 1 CAPSULE AT BEDTIME AS NEEDED       traZODone (DESYREL) 50 MG tablet Take 1-2 tablets ( mg) by mouth nightly as needed for sleep 90 tablet prn     celecoxib (CELEBREX) 100 MG capsule Take 1 capsule (100 mg) by mouth 2 times daily 60  "capsule 1     Allergies   Allergen Reactions     Bee Venom          Mammogram Screening - Patient over age 75, has elected to discontinue screenings.  Pertinent mammograms are reviewed under the imaging tab.    Review of Systems   Constitutional: Negative for chills and fever.   HENT: Negative for congestion, ear pain, hearing loss and sore throat.    Eyes: Negative for pain and visual disturbance.   Respiratory: Negative for cough and shortness of breath.    Cardiovascular: Negative for chest pain, palpitations and peripheral edema.   Gastrointestinal: Positive for constipation. Negative for abdominal pain, diarrhea, heartburn, hematochezia and nausea.   Breasts:  Negative for tenderness, breast mass and discharge.   Genitourinary: Positive for frequency, hematuria and urgency. Negative for genital sores, pelvic pain, vaginal bleeding and vaginal discharge.   Musculoskeletal: Positive for arthralgias. Negative for joint swelling and myalgias.   Skin: Negative for rash.   Neurological: Negative for dizziness, weakness, headaches and paresthesias.   Psychiatric/Behavioral: Positive for mood changes. The patient is nervous/anxious.          OBJECTIVE:   /74   Pulse 67   Temp 98.4  F (36.9  C) (Tympanic)   Resp 16   Ht 1.664 m (5' 5.5\")   Wt 81.6 kg (180 lb)   SpO2 98%   BMI 29.50 kg/m   Estimated body mass index is 29.5 kg/m  as calculated from the following:    Height as of this encounter: 1.664 m (5' 5.5\").    Weight as of this encounter: 81.6 kg (180 lb).  Physical Exam  GENERAL APPEARANCE: healthy, alert and no distress  EYES: Eyes grossly normal to inspection, PERRL and conjunctivae and sclerae normal  HENT: ear canals and TM's normal  NECK: no adenopathy, no asymmetry, masses, or scars and thyroid normal to palpation  RESP: lungs clear to auscultation - no rales, rhonchi or wheezes  BREAST: normal without masses, tenderness or nipple discharge and no palpable axillary masses or adenopathy  CV: " regular rate and rhythm, normal S1 S2, no S3 or S4, no murmur, click or rub, no peripheral edema and peripheral pulses strong  ABDOMEN: soft, nontender, no hepatosplenomegaly, no masses and bowel sounds normal  MS: no musculoskeletal defects are noted and gait is age appropriate without ataxia  SKIN: no suspicious lesions or rashes  NEURO: Normal strength and tone, sensory exam grossly normal, mentation intact and speech normal  PSYCH: mentation appears normal and affect normal/bright    Diagnostic Test Results:  Labs reviewed in Epic    ASSESSMENT / PLAN:   Encounter for Medicare annual wellness exam    Stage 3a chronic kidney disease (H)  Stable check labs.   - Albumin Random Urine Quantitative with Creat Ratio; Future  - Basic metabolic panel; Future  - Albumin Random Urine Quantitative with Creat Ratio  - Basic metabolic panel    Hyperlipidemia LDL goal <100  Stable. Refilled medication and checked labs.    - Lipid panel reflex to direct LDL Non-fasting; Future  - atorvastatin (LIPITOR) 40 MG tablet; Take 1 tablet (40 mg) by mouth daily  - Lipid panel reflex to direct LDL Non-fasting    Essential hypertension with goal blood pressure less than 140/90  Well controlled. Refilled medication. Check labs.    - atenolol (TENORMIN) 25 MG tablet; Take 1 tablet (25 mg) by mouth daily  - hydrochlorothiazide (HYDRODIURIL) 25 MG tablet; Take 1 tablet (25 mg) by mouth every morning    Acquired hypothyroidism  Well controlled. Refilled medication. Check labs.    - TSH WITH FREE T4 REFLEX; Future  - levothyroxine (SYNTHROID/LEVOTHROID) 88 MCG tablet; Take 1 tablet (88 mcg) by mouth daily  - TSH WITH FREE T4 REFLEX    Hypothyroidism  Well controlled. Refilled medication. Check labs.    - TSH WITH FREE T4 REFLEX; Future  - levothyroxine (SYNTHROID/LEVOTHROID) 88 MCG tablet; Take 1 tablet (88 mcg) by mouth daily  - TSH WITH FREE T4 REFLEX    Adjustment insomnia  - traZODone (DESYREL) 50 MG tablet; Take 1-2 tablets ( mg) by  "mouth nightly as needed for sleep    Anxiety  Start cymbalta.  Discussed use and side effects.   - DULoxetine (CYMBALTA) 30 MG capsule; Take 1 capsule (30 mg) by mouth daily        Patient has been advised of split billing requirements and indicates understanding: Yes      COUNSELING:  Reviewed preventive health counseling, as reflected in patient instructions    Estimated body mass index is 29.5 kg/m  as calculated from the following:    Height as of this encounter: 1.664 m (5' 5.5\").    Weight as of this encounter: 81.6 kg (180 lb).        She reports that she has never smoked. She has never used smokeless tobacco.      Appropriate preventive services were discussed with this patient, including applicable screening as appropriate for cardiovascular disease, diabetes, osteopenia/osteoporosis, and glaucoma.  As appropriate for age/gender, discussed screening for colorectal cancer, prostate cancer, breast cancer, and cervical cancer. Checklist reviewing preventive services available has been given to the patient.    Reviewed patients plan of care and provided an AVS. The Intermediate Care Plan ( asthma action plan, low back pain action plan, and migraine action plan) for Lina meets the Care Plan requirement. This Care Plan has been established and reviewed with the Patient.    Counseling Resources:  ATP IV Guidelines  Pooled Cohorts Equation Calculator  Breast Cancer Risk Calculator  Breast Cancer: Medication to Reduce Risk  FRAX Risk Assessment  ICSI Preventive Guidelines  Dietary Guidelines for Americans, 2010  USDA's MyPlate  ASA Prophylaxis  Lung CA Screening    Adrienne Tejeda MD  [facility]    Identified Health Risks:  "

## 2022-11-04 NOTE — TELEPHONE ENCOUNTER
Lina just called wondering where her 'pain' pills were that you were going to send to the pharmacy.  She got the cymbalta only. She thought she was getting something for her arthritis pain.  Please advise    Thank you  Marylou Henao on 11/4/2022 at 2:55 PM

## 2022-11-04 NOTE — TELEPHONE ENCOUNTER
Sorry, I must have misunderstood.  We had talked about both Cymbalta and Celebrex and I get the impression that she just wanted to try the Cymbalta.  I can certainly fax a prescription for the Celebrex as well.

## 2022-11-07 ENCOUNTER — OFFICE VISIT (OUTPATIENT)
Dept: DERMATOLOGY | Facility: CLINIC | Age: 87
End: 2022-11-07
Payer: COMMERCIAL

## 2022-11-07 DIAGNOSIS — Z85.828 HISTORY OF SCC (SQUAMOUS CELL CARCINOMA) OF SKIN: ICD-10-CM

## 2022-11-07 DIAGNOSIS — L81.4 LENTIGO: ICD-10-CM

## 2022-11-07 DIAGNOSIS — L57.0 ACTINIC KERATOSIS: Primary | ICD-10-CM

## 2022-11-07 PROCEDURE — 99202 OFFICE O/P NEW SF 15 MIN: CPT | Mod: 25 | Performed by: PHYSICIAN ASSISTANT

## 2022-11-07 PROCEDURE — 17003 DESTRUCT PREMALG LES 2-14: CPT | Performed by: PHYSICIAN ASSISTANT

## 2022-11-07 PROCEDURE — 17000 DESTRUCT PREMALG LESION: CPT | Performed by: PHYSICIAN ASSISTANT

## 2022-11-07 ASSESSMENT — PAIN SCALES - GENERAL: PAINLEVEL: NO PAIN (0)

## 2022-11-07 NOTE — PROGRESS NOTES
HPI:   Chief complaints: Lina Hendrix is a pleasant 87 year old female who presents for evaluation of scaly spots on the face. They have been present for awhile and will not heal. None are painful and none will bleed.       PHYSICAL EXAM:    There were no vitals taken for this visit.  Skin exam performed as follows: Type 2 skin. Mood appropriate  Alert and Oriented X 3. Well developed, well nourished in no distress.  General appearance: Normal  Head including face: Normal  Eyes: conjunctiva and lids: Normal  Mouth: Lips, teeth, gums: Normal  Neck: Normal  Cardiovascular: Exam of peripheral vascular system by observation for swelling, varicosities, edema: Normal  Right upper extremity: Normal  Left upper extremity: Normal  Right lower extremity: Normal  Left lower extremity: Normal  Skin: Scalp and body hair: See below    Pink gritty papule on the left forehead x 1, central forehead x 1, right forehead x 1, right nasal side wall x 1, left nasal side wall x 1  Brown and tan macules on the face     ASSESSMENT/PLAN:     1. Actinic keratosis on the left forehead x 1, central forehead x 1, right forehead x 1, right nasal side wall x 1, left nasal side wall x 1. As precancerous, cryosurgery performed. Advised on blistering and post-op care. Advised if not resolved in 1-2 months to return for evaluation  2. Lentigos on the face - advised benign no treatment needed            Follow-up: yearly  CC:   Scribed By: Kathy Vasquez, MS, PA-C

## 2022-11-07 NOTE — LETTER
11/7/2022         RE: Lina Hendrix  [id number]  [facility] [id number]        Dear Colleague,    Thank you for referring your patient, Lina Hendrix, to the [facility]. Please see a copy of my visit note below.    HPI:   Chief complaints: Lina Hendrix is a pleasant 87 year old female who presents for evaluation of scaly spots on the face. They have been present for awhile and will not heal. None are painful and none will bleed.       PHYSICAL EXAM:    There were no vitals taken for this visit.  Skin exam performed as follows: Type 2 skin. Mood appropriate  Alert and Oriented X 3. Well developed, well nourished in no distress.  General appearance: Normal  Head including face: Normal  Eyes: conjunctiva and lids: Normal  Mouth: Lips, teeth, gums: Normal  Neck: Normal  Cardiovascular: Exam of peripheral vascular system by observation for swelling, varicosities, edema: Normal  Right upper extremity: Normal  Left upper extremity: Normal  Right lower extremity: Normal  Left lower extremity: Normal  Skin: Scalp and body hair: See below    Pink gritty papule on the left forehead x 1, central forehead x 1, right forehead x 1, right nasal side wall x 1, left nasal side wall x 1  Brown and tan macules on the face     ASSESSMENT/PLAN:     1. Actinic keratosis on the left forehead x 1, central forehead x 1, right forehead x 1, right nasal side wall x 1, left nasal side wall x 1. As precancerous, cryosurgery performed. Advised on blistering and post-op care. Advised if not resolved in 1-2 months to return for evaluation  2. Lentigos on the face - advised benign no treatment needed            Follow-up: yearly  CC:   Scribed By: Katyh Vasquez, MS, PAELMER          Again, thank you for allowing me to participate in the care of your patient.        Sincerely,        Kathy Vasquez PA-C

## 2022-11-07 NOTE — TELEPHONE ENCOUNTER
Writer left message for patient to return call to clinic.    Inform patient that Dr. Tejeda refilled Cymbalta to [facility] in [place].    Pilar ACKERMAN

## 2022-11-18 NOTE — PATIENT INSTRUCTIONS
Patient Education   Personalized Prevention Plan  You are due for the preventive services outlined below.  Your care team is available to assist you in scheduling these services.  If you have already completed any of these items, please share that information with your care team to update in your medical record.  Health Maintenance Due   Topic Date Due     Annual Wellness Visit  08/05/2015     Zoster (Shingles) Vaccine (2 of 3) 08/22/2016

## 2022-11-30 ENCOUNTER — TELEPHONE (OUTPATIENT)
Dept: FAMILY MEDICINE | Facility: CLINIC | Age: 87
End: 2022-11-30

## 2022-11-30 NOTE — TELEPHONE ENCOUNTER
"S-(situation): Increased pain    B-(background): Office visit 11/04/22, started Celebrex and Cymbalta for back pain and has had no relief    A-(assessment): she has been taking meds as prescribed with an occasional addition of tylenol 500 mg with no relief. Her pain has gotten worse. She states it is 8/10, constant day and night. It is preventing her from sleeping. She cannot walk without a cane. The pain is now in every joint from her neck to her ankles. Her right leg is the worst area, she has several spots that are very sore and \"pound like nerve pain\". No fever, redness or swelling.     R-(recommendations): She is wondering if this could be polymyalgia. Please advise. Care team, please call patient with the plan.  Mackenzie CARPENTER RN        "

## 2022-11-30 NOTE — TELEPHONE ENCOUNTER
Polymyalgia is a general and not specific term but you certainly could be some autoimmune disorder.  I would recommend office visit for exam and lab work.

## 2022-11-30 NOTE — TELEPHONE ENCOUNTER
Writer called patient.  Patient was placed on Dr Tejeda's same day appt on Friday 12/2/22    Aaron [facility]

## 2022-12-02 ENCOUNTER — OFFICE VISIT (OUTPATIENT)
Dept: FAMILY MEDICINE | Facility: CLINIC | Age: 87
End: 2022-12-02
Payer: COMMERCIAL

## 2022-12-02 VITALS
WEIGHT: 173 LBS | OXYGEN SATURATION: 96 % | DIASTOLIC BLOOD PRESSURE: 68 MMHG | TEMPERATURE: 96.9 F | BODY MASS INDEX: 27.8 KG/M2 | SYSTOLIC BLOOD PRESSURE: 132 MMHG | HEART RATE: 82 BPM | HEIGHT: 66 IN | RESPIRATION RATE: 16 BRPM

## 2022-12-02 DIAGNOSIS — M25.50 ARTHRALGIA, UNSPECIFIED JOINT: Primary | ICD-10-CM

## 2022-12-02 LAB
ALBUMIN SERPL BCG-MCNC: 4.6 G/DL (ref 3.5–5.2)
ALP SERPL-CCNC: 85 U/L (ref 35–104)
ALT SERPL W P-5'-P-CCNC: 31 U/L (ref 10–35)
ANION GAP SERPL CALCULATED.3IONS-SCNC: 10 MMOL/L (ref 7–15)
AST SERPL W P-5'-P-CCNC: 32 U/L (ref 10–35)
BASOPHILS # BLD AUTO: 0.1 10E3/UL (ref 0–0.2)
BASOPHILS NFR BLD AUTO: 1 %
BILIRUB SERPL-MCNC: 0.4 MG/DL
BUN SERPL-MCNC: 18.8 MG/DL (ref 8–23)
CALCIUM SERPL-MCNC: 10 MG/DL (ref 8.8–10.2)
CHLORIDE SERPL-SCNC: 94 MMOL/L (ref 98–107)
CREAT SERPL-MCNC: 1.02 MG/DL (ref 0.51–0.95)
CRP SERPL-MCNC: <3 MG/L
DEPRECATED HCO3 PLAS-SCNC: 28 MMOL/L (ref 22–29)
EOSINOPHIL # BLD AUTO: 0.3 10E3/UL (ref 0–0.7)
EOSINOPHIL NFR BLD AUTO: 4 %
ERYTHROCYTE [DISTWIDTH] IN BLOOD BY AUTOMATED COUNT: 12.1 % (ref 10–15)
ERYTHROCYTE [SEDIMENTATION RATE] IN BLOOD BY WESTERGREN METHOD: 16 MM/HR (ref 0–30)
GFR SERPL CREATININE-BSD FRML MDRD: 54 ML/MIN/1.73M2
GLUCOSE SERPL-MCNC: 106 MG/DL (ref 70–99)
HCT VFR BLD AUTO: 39.7 % (ref 35–47)
HGB BLD-MCNC: 13.8 G/DL (ref 11.7–15.7)
IMM GRANULOCYTES # BLD: 0 10E3/UL
IMM GRANULOCYTES NFR BLD: 0 %
LYMPHOCYTES # BLD AUTO: 1.4 10E3/UL (ref 0.8–5.3)
LYMPHOCYTES NFR BLD AUTO: 23 %
MCH RBC QN AUTO: 31.4 PG (ref 26.5–33)
MCHC RBC AUTO-ENTMCNC: 34.8 G/DL (ref 31.5–36.5)
MCV RBC AUTO: 90 FL (ref 78–100)
MONOCYTES # BLD AUTO: 0.6 10E3/UL (ref 0–1.3)
MONOCYTES NFR BLD AUTO: 9 %
NEUTROPHILS # BLD AUTO: 3.9 10E3/UL (ref 1.6–8.3)
NEUTROPHILS NFR BLD AUTO: 62 %
PLATELET # BLD AUTO: 270 10E3/UL (ref 150–450)
POTASSIUM SERPL-SCNC: 3.6 MMOL/L (ref 3.4–5.3)
PROT SERPL-MCNC: 7.4 G/DL (ref 6.4–8.3)
RBC # BLD AUTO: 4.39 10E6/UL (ref 3.8–5.2)
SODIUM SERPL-SCNC: 132 MMOL/L (ref 136–145)
URATE SERPL-MCNC: 5.7 MG/DL (ref 2.4–5.7)
WBC # BLD AUTO: 6.2 10E3/UL (ref 4–11)

## 2022-12-02 PROCEDURE — 86225 DNA ANTIBODY NATIVE: CPT | Performed by: FAMILY MEDICINE

## 2022-12-02 PROCEDURE — 86200 CCP ANTIBODY: CPT | Performed by: FAMILY MEDICINE

## 2022-12-02 PROCEDURE — 86618 LYME DISEASE ANTIBODY: CPT | Performed by: FAMILY MEDICINE

## 2022-12-02 PROCEDURE — 86039 ANTINUCLEAR ANTIBODIES (ANA): CPT | Performed by: FAMILY MEDICINE

## 2022-12-02 PROCEDURE — 85025 COMPLETE CBC W/AUTO DIFF WBC: CPT | Performed by: FAMILY MEDICINE

## 2022-12-02 PROCEDURE — 86140 C-REACTIVE PROTEIN: CPT | Performed by: FAMILY MEDICINE

## 2022-12-02 PROCEDURE — 86038 ANTINUCLEAR ANTIBODIES: CPT | Performed by: FAMILY MEDICINE

## 2022-12-02 PROCEDURE — 84550 ASSAY OF BLOOD/URIC ACID: CPT | Performed by: FAMILY MEDICINE

## 2022-12-02 PROCEDURE — 85652 RBC SED RATE AUTOMATED: CPT | Performed by: FAMILY MEDICINE

## 2022-12-02 PROCEDURE — 80053 COMPREHEN METABOLIC PANEL: CPT | Performed by: FAMILY MEDICINE

## 2022-12-02 PROCEDURE — 36415 COLL VENOUS BLD VENIPUNCTURE: CPT | Performed by: FAMILY MEDICINE

## 2022-12-02 PROCEDURE — 86431 RHEUMATOID FACTOR QUANT: CPT | Performed by: FAMILY MEDICINE

## 2022-12-02 PROCEDURE — 99214 OFFICE O/P EST MOD 30 MIN: CPT | Performed by: FAMILY MEDICINE

## 2022-12-02 RX ORDER — PREDNISONE 20 MG/1
20 TABLET ORAL 2 TIMES DAILY
Qty: 14 TABLET | Refills: 1 | Status: SHIPPED | OUTPATIENT
Start: 2022-12-02 | End: 2022-12-09

## 2022-12-02 RX ORDER — HYDROCODONE BITARTRATE AND ACETAMINOPHEN 5; 325 MG/1; MG/1
1 TABLET ORAL EVERY 6 HOURS PRN
Qty: 20 TABLET | Refills: 0 | Status: CANCELLED | OUTPATIENT
Start: 2022-12-02

## 2022-12-02 ASSESSMENT — PAIN SCALES - GENERAL: PAINLEVEL: EXTREME PAIN (8)

## 2022-12-02 NOTE — PROGRESS NOTES
"  Assessment & Plan     Arthralgia, unspecified joint  Will check autoimmune labs and also sed rate to rule out PMR. Further work-up and management as dictated by results.   - Anti Nuclear Yana IgG by IFA with Reflex; Future  - CRP inflammation; Future  - Cyclic Citrullinated Peptide Antibody IgG; Future  - DNA double stranded antibodies; Future  - Rheumatoid factor; Future  - Erythrocyte sedimentation rate auto; Future  - Uric acid; Future  - Lyme Disease Total Abs Bld with Reflex to Confirm CLIA; Future  - predniSONE (DELTASONE) 20 MG tablet; Take 1 tablet (20 mg) by mouth 2 times daily for 7 days  - CBC with Platelets & Differential; Future  - Comprehensive metabolic panel; Future  - Anti Nuclear Yana IgG by IFA with Reflex  - CRP inflammation  - Cyclic Citrullinated Peptide Antibody IgG  - DNA double stranded antibodies  - Rheumatoid factor  - Erythrocyte sedimentation rate auto  - Uric acid  - Lyme Disease Total Abs Bld with Reflex to Confirm CLIA  - CBC with Platelets & Differential  - Comprehensive metabolic panel             BMI:   Estimated body mass index is 28.35 kg/m  as calculated from the following:    Height as of this encounter: 1.664 m (5' 5.5\").    Weight as of this encounter: 78.5 kg (173 lb).           No follow-ups on file.    Adrienne Tejeda MD  [facility]    Amara Mills is a 87 year old, presenting for the following health issues:  Pain      History of Present Illness       Back Pain:  She presents for follow up of back pain. Patient's back pain is a recurring problem.  Location of back pain:  Other  Description of back pain: gnawing  Back pain spreads: right buttocks, left buttocks, right thigh, left thigh, right knee, left knee and right foot    Since patient first noticed back pain, pain is: always present, but gets better and worse  Does back pain interfere with her job:  Not applicable      Reason for visit:  Pain back shoulders hips legs level 6 " "to 8  Symptom onset:  1-2 weeks ago  Symptom intensity:  Moderate  Symptom progression:  Staying the same    She eats 4 or more servings of fruits and vegetables daily.She consumes 0 sweetened beverage(s) daily.She exercises with enough effort to increase her heart rate 20 to 29 minutes per day.  She exercises with enough effort to increase her heart rate 6 days per week.   She is taking medications regularly.           Review of Systems   Constitutional, neuro, ENT, endocrine, pulmonary, cardiac, gastrointestinal, genitourinary, musculoskeletal, integument and psychiatric systems are negative, except as otherwise noted.       Objective    /68   Pulse 82   Temp 96.9  F (36.1  C) (Tympanic)   Resp 16   Ht 1.664 m (5' 5.5\")   Wt 78.5 kg (173 lb)   SpO2 96%   BMI 28.35 kg/m    Body mass index is 28.35 kg/m .  Physical Exam   GENERAL: Pleasant, well appearing female.  MUSCULOSKELETAL:  No midline vertebral tenderness to palpation. Has bilateral paravertebral tenderness and tightness.  Strength is 4/5 and DTR 2+ and symmetric throughout lower extremities.                     "

## 2022-12-02 NOTE — NURSING NOTE
"Initial /68   Pulse 82   Temp 96.9  F (36.1  C) (Tympanic)   Resp 16   Ht 1.664 m (5' 5.5\")   Wt 78.5 kg (173 lb)   SpO2 96%   BMI 28.35 kg/m   Estimated body mass index is 28.35 kg/m  as calculated from the following:    Height as of this encounter: 1.664 m (5' 5.5\").    Weight as of this encounter: 78.5 kg (173 lb). .      "

## 2022-12-05 LAB
ANA PAT SER IF-IMP: ABNORMAL
ANA SER QL IF: POSITIVE
ANA TITR SER IF: ABNORMAL {TITER}
B BURGDOR IGG+IGM SER QL: 0.05
CCP AB SER IA-ACNC: <0.4 U/ML
DSDNA AB SER-ACNC: 4 IU/ML
RHEUMATOID FACT SER NEPH-ACNC: 8 IU/ML

## 2022-12-24 DIAGNOSIS — M15.9 OSTEOARTHRITIS OF MULTIPLE JOINTS, UNSPECIFIED OSTEOARTHRITIS TYPE: ICD-10-CM

## 2022-12-24 DIAGNOSIS — F41.9 ANXIETY: ICD-10-CM

## 2022-12-26 RX ORDER — DULOXETIN HYDROCHLORIDE 30 MG/1
30 CAPSULE, DELAYED RELEASE ORAL DAILY
Qty: 30 CAPSULE | Refills: 1 | Status: SHIPPED | OUTPATIENT
Start: 2022-12-26 | End: 2023-02-20

## 2022-12-26 RX ORDER — CELECOXIB 100 MG/1
100 CAPSULE ORAL 2 TIMES DAILY
Qty: 60 CAPSULE | Refills: 1 | Status: SHIPPED | OUTPATIENT
Start: 2022-12-26 | End: 2023-02-22

## 2022-12-26 NOTE — TELEPHONE ENCOUNTER
"Requested Prescriptions   Pending Prescriptions Disp Refills    DULoxetine (CYMBALTA) 30 MG capsule [Pharmacy Med Name: duloxetine 30 mg capsule,delayed release] 30 capsule 1     Sig: Take 1 capsule (30 mg) by mouth daily       Serotonin-Norepinephrine Reuptake Inhibitors  Passed - 12/24/2022  8:04 AM        Passed - Blood pressure under 140/90 in past 12 months     BP Readings from Last 3 Encounters:   12/02/22 132/68   11/04/22 134/74   10/30/22 (!) 189/88                 Passed - Recent (12 mo) or future (30 days) visit within the authorizing provider's specialty     Patient has had an office visit with the authorizing provider or a provider within the authorizing providers department within the previous 12 mos or has a future within next 30 days. See \"Patient Info\" tab in inbasket, or \"Choose Columns\" in Meds & Orders section of the refill encounter.              Passed - Medication is active on med list        Passed - Patient is age 18 or older        Passed - No active pregnancy on record        Passed - No positive pregnancy test in past 12 months          celecoxib (CELEBREX) 100 MG capsule [Pharmacy Med Name: celecoxib 100 mg capsule] 60 capsule 1     Sig: Take 1 capsule (100 mg) by mouth 2 times daily       NSAID Medications Failed - 12/24/2022  8:04 AM        Failed - Patient is age 6-64 years        Failed - Normal serum creatinine on file in past 12 months     Recent Labs   Lab Test 12/02/22  1708   CR 1.02*       Ok to refill medication if creatinine is low          Passed - Blood pressure under 140/90 in past 12 months     BP Readings from Last 3 Encounters:   12/02/22 132/68   11/04/22 134/74   10/30/22 (!) 189/88                 Passed - Normal ALT on file in past 12 months     Recent Labs   Lab Test 12/02/22 1708   ALT 31             Passed - Normal AST on file in past 12 months     Recent Labs   Lab Test 12/02/22 1708   AST 32             Passed - Recent (12 mo) or future (30 days) visit within " "the authorizing provider's specialty     Patient has had an office visit with the authorizing provider or a provider within the authorizing providers department within the previous 12 mos or has a future within next 30 days. See \"Patient Info\" tab in inbasket, or \"Choose Columns\" in Meds & Orders section of the refill encounter.              Passed - Normal CBC on file in past 12 months     Recent Labs   Lab Test 12/02/22  1708   WBC 6.2   RBC 4.39   HGB 13.8   HCT 39.7                    Passed - Medication is active on med list        Passed - No active pregnancy on record        Passed - No positive pregnancy test in past 12 months             "

## 2023-01-03 ENCOUNTER — MYC MEDICAL ADVICE (OUTPATIENT)
Dept: FAMILY MEDICINE | Facility: CLINIC | Age: 88
End: 2023-01-03

## 2023-01-03 DIAGNOSIS — M25.50 ARTHRALGIA, UNSPECIFIED JOINT: Primary | ICD-10-CM

## 2023-01-12 NOTE — TELEPHONE ENCOUNTER
Given improvement with prednisone and worsening after stopping, even though DELFINA was only mildly elevated, I would recommend rheumatology consult.

## 2023-02-17 DIAGNOSIS — F41.9 ANXIETY: ICD-10-CM

## 2023-02-17 DIAGNOSIS — M15.9 OSTEOARTHRITIS OF MULTIPLE JOINTS, UNSPECIFIED OSTEOARTHRITIS TYPE: ICD-10-CM

## 2023-02-20 RX ORDER — DULOXETIN HYDROCHLORIDE 30 MG/1
30 CAPSULE, DELAYED RELEASE ORAL DAILY
Qty: 30 CAPSULE | Refills: 1 | Status: SHIPPED | OUTPATIENT
Start: 2023-02-20 | End: 2023-03-24

## 2023-02-20 NOTE — TELEPHONE ENCOUNTER
Duloxetine Prescription approved per [facility] Refill Protocol     Juliet Camacho RN MSN

## 2023-02-22 RX ORDER — CELECOXIB 100 MG/1
100 CAPSULE ORAL 2 TIMES DAILY
Qty: 60 CAPSULE | Refills: 1 | Status: SHIPPED | OUTPATIENT
Start: 2023-02-22 | End: 2023-03-24

## 2023-07-22 ENCOUNTER — APPOINTMENT (OUTPATIENT)
Dept: GENERAL RADIOLOGY | Facility: CLINIC | Age: 88
End: 2023-07-22
Attending: PHYSICIAN ASSISTANT
Payer: COMMERCIAL

## 2023-07-22 ENCOUNTER — HOSPITAL ENCOUNTER (EMERGENCY)
Facility: CLINIC | Age: 88
Discharge: HOME | End: 2023-07-22
Attending: PHYSICIAN ASSISTANT | Admitting: PHYSICIAN ASSISTANT
Payer: COMMERCIAL

## 2023-07-22 VITALS
HEART RATE: 78 BPM | OXYGEN SATURATION: 98 % | TEMPERATURE: 97.9 F | RESPIRATION RATE: 18 BRPM | DIASTOLIC BLOOD PRESSURE: 78 MMHG | SYSTOLIC BLOOD PRESSURE: 143 MMHG

## 2023-07-22 DIAGNOSIS — M25.50 ARTHRALGIA, UNSPECIFIED JOINT: ICD-10-CM

## 2023-07-22 DIAGNOSIS — M25.50 MULTIPLE JOINT PAIN: ICD-10-CM

## 2023-07-22 DIAGNOSIS — M25.50 ARTHRALGIA: ICD-10-CM

## 2023-07-22 DIAGNOSIS — M54.6 ACUTE LEFT-SIDED THORACIC BACK PAIN: ICD-10-CM

## 2023-07-22 DIAGNOSIS — R82.90 ABNORMAL URINALYSIS: ICD-10-CM

## 2023-07-22 LAB
ALBUMIN UR-MCNC: NEGATIVE MG/DL
APPEARANCE UR: ABNORMAL
BACTERIA #/AREA URNS HPF: ABNORMAL /HPF
BILIRUB UR QL STRIP: NEGATIVE
COLOR UR AUTO: YELLOW
GLUCOSE UR STRIP-MCNC: NEGATIVE MG/DL
HGB UR QL STRIP: ABNORMAL
HYALINE CASTS: 4 /LPF
KETONES UR STRIP-MCNC: NEGATIVE MG/DL
LEUKOCYTE ESTERASE UR QL STRIP: ABNORMAL
MUCOUS THREADS #/AREA URNS LPF: PRESENT /LPF
NITRATE UR QL: NEGATIVE
PH UR STRIP: 6 [PH] (ref 5–7)
RBC URINE: 12 /HPF
SP GR UR STRIP: 1.01 (ref 1–1.03)
SQUAMOUS EPITHELIAL: 1 /HPF
UROBILINOGEN UR STRIP-MCNC: NORMAL MG/DL
WBC CLUMPS #/AREA URNS HPF: PRESENT /HPF
WBC URINE: >182 /HPF

## 2023-07-22 PROCEDURE — 81001 URINALYSIS AUTO W/SCOPE: CPT | Performed by: PHYSICIAN ASSISTANT

## 2023-07-22 PROCEDURE — 87086 URINE CULTURE/COLONY COUNT: CPT | Performed by: PHYSICIAN ASSISTANT

## 2023-07-22 PROCEDURE — 71046 X-RAY EXAM CHEST 2 VIEWS: CPT

## 2023-07-22 PROCEDURE — 250N000013 HC RX MED GY IP 250 OP 250 PS 637: Performed by: PHYSICIAN ASSISTANT

## 2023-07-22 PROCEDURE — 99214 OFFICE O/P EST MOD 30 MIN: CPT | Performed by: PHYSICIAN ASSISTANT

## 2023-07-22 PROCEDURE — G0463 HOSPITAL OUTPT CLINIC VISIT: HCPCS | Mod: 25 | Performed by: PHYSICIAN ASSISTANT

## 2023-07-22 RX ORDER — LIDOCAINE 4 G/G
1 PATCH TOPICAL
Status: DISCONTINUED | OUTPATIENT
Start: 2023-07-22 | End: 2023-07-22 | Stop reason: HOSPADM

## 2023-07-22 RX ORDER — CEFDINIR 300 MG/1
300 CAPSULE ORAL 2 TIMES DAILY
Qty: 20 CAPSULE | Refills: 0 | Status: SHIPPED | OUTPATIENT
Start: 2023-07-22 | End: 2023-08-01

## 2023-07-22 RX ORDER — PREDNISONE 20 MG/1
TABLET ORAL
Qty: 20 TABLET | Refills: 0 | Status: SHIPPED | OUTPATIENT
Start: 2023-07-22

## 2023-07-22 RX ORDER — ACETAMINOPHEN 325 MG/1
975 TABLET ORAL ONCE
Status: COMPLETED | OUTPATIENT
Start: 2023-07-22 | End: 2023-07-22

## 2023-07-22 RX ADMIN — ACETAMINOPHEN 975 MG: 325 TABLET, FILM COATED ORAL at 12:21

## 2023-07-22 RX ADMIN — LIDOCAINE 1 PATCH: 560 PATCH PERCUTANEOUS; TOPICAL; TRANSDERMAL at 12:22

## 2023-07-22 ASSESSMENT — ENCOUNTER SYMPTOMS
NEUROLOGICAL NEGATIVE: 1
CONSTITUTIONAL NEGATIVE: 1
BACK PAIN: 1
GASTROINTESTINAL NEGATIVE: 1

## 2023-07-22 ASSESSMENT — ACTIVITIES OF DAILY LIVING (ADL): ADLS_ACUITY_SCORE: 35

## 2023-07-22 NOTE — ED TRIAGE NOTES
Pt reports upper left sided back pain , rating 10/10 with onset last night that has been constant, no relief in pain.  Pt states she took 200mg Celebrex at 0900.   Pt denies any falls or injury.

## 2023-07-22 NOTE — ED PROVIDER NOTES
History     Chief Complaint   Patient presents with    Back Pain     HPI  Lina Hendrix is an 88 year old female with a past medical history of of allergic rhinitis, acquired hypothyroidism, stage III chronic kidney disease, essential hypertension, squamous cell carcinoma, vaginal hysterectomy anxiety and back pain who presents for evaluation of acute onset left-sided thoracic back pain which began over the past 24 hours.  She denies any repetitive lifting or trauma.  Has been mentioned in the past that she may have polymyalgia rheumatica considering body aches in the shoulders and back and lower extremities that she typically experiences daily in the morning.  She has tried prednisone for this concern in the past which has helped.  She has tried Celebrex last night for relief of her current symptoms of back pain, has not helped much.  Current back pain is exacerbated with nonspecific movements.   She was seen and evaluated for a similar concern in October 2022 with an unknown cause, suspected musculoskeletal pain.  She was given Norco at that time which did help.  Urinalysis was abnormal at that time as well, started on cefdinir due to concern for urinary tract infection.  She denies any chest pain or shortness of breath currently, no coughing, no pain or swelling in the lower extremities, no recent travel, not on blood thinners.  No rashes.  Chart reviewed, the patient was seen and evaluated for arthralgias in December 2022 and elevated DELFINA, started on prednisone due to concern for polymyalgia rheumatica.  States that the prednisone helped significantly.    Allergies:  Allergies   Allergen Reactions    Bee Venom        Problem List:    Patient Active Problem List    Diagnosis Date Noted    Essential hypertension with goal blood pressure less than 140/90 01/02/2006     Priority: High     switched from Timolol to Atenolol 1/2/06  Borderline GFR [place] filtration rate of kidneys   Letter from [facility] re screening - see scanned copy GFR [place] filtration rate of kidneys of 60 creat 0.93 identifying her as chronic kidney disease stage 2 - will cont to monitor for change  Problem list name updated by automated process. Provider to review      Stage 3a chronic kidney disease (H) 03/24/2022     Priority: Medium    Adjustment insomnia 03/24/2022     Priority: Medium    Advance care planning 08/18/2014     Priority: Medium     Advance Care Planning:   Receipt of ACP document:  Received: Health Care Directive which was witnessed or notarized on 11/29/2013.  Document not previously scanned.  Validation form completed and sent with document to be scanned.  Code Status reflects choices in most recent ACP document.  Confirmed/documented designated decision maker(s). See permanent comments section of demographics in clinical tab. View document(s) and details by clicking on code status.   Added by Toshia Pool on 8/18/2014.            Osteopenia 08/08/2013     Priority: Medium    History of anxiety 01/31/2013     Priority: Medium    SK (seborrheic keratosis) 12/11/2012     Priority: Medium    Lentigo 12/11/2012     Priority: Medium    History of skin cancer 12/11/2012     Priority: Medium    Anxiety 07/28/2011     Priority: Medium    Health Care Home 04/19/2011     Priority: Medium     High priority patient - 4/19/11      DX V65.8 REPLACED WITH 08569 [facility]ALTH CARE HOME (04/08/2013)      Hyperlipidemia LDL goal <100 10/31/2010     Priority: Medium    Vaginal foreign body 06/14/2010     Priority: Medium     Granulation tissue cauterized along posterior vault 5/10; when granulation receded, a small 3mm area of mesh was visible exposed along post vault; pt asymptomatic--no pain, bleeding or drainage; no tx needed unless symptomatic      S/P hysterectomy 04/27/2010     Priority: Medium    Acquired hypothyroidism 03/19/2008     Priority: Medium    Allergic rhinitis due to other allergen 12/27/2007      Priority: Medium    Esophageal reflux 12/27/2007     Priority: Medium    Goiter 11/29/2007     Priority: Medium     November 29, 2007   THYROID ultrasound   IMPRESSION:  1. 2.0 cm complex cystic nodule the right side of the thyroid isthmus.  2. Two tiny less than 0.5 cm diameter possible nodules in the inferior  right lobe of the thyroid gland.   Hemithyroidectomy 1/3/08  Problem list name updated by automated process. Provider to review      Dyspnea and respiratory abnormality 11/26/2007     Priority: Medium     Pulmonarty testing  methacholine challenge -- no significant response 9/07  Problem list name updated by automated process. Provider to review      SCREENING MAL NEOP-COLON 07/11/2007     Priority: Medium     7/07  Impression:     - Diverticulosis. There was no evidence of diverticular                  bleeding.  Recommendation: - Discharge patient to home (ambulatory).                 - Augmented water consumption.                 - High fiber diet for the rest of the patient's life.                 - Use fiber, for example Citrucel, Fibercon, Konsyl or                  Metamucil                 - Repeat colonoscopy in 10 years for screening purposes.                 - Patient was given an informational booklet on                  diverticulosis/diverticulitis.                                                                                        Breast disorder 01/15/2007     Priority: Medium     Breast reduction 1/06 has residual scar and rubbery 1 cm nodule at 6:00 right breast   Problem list name updated by automated process. Provider to review      Rectocele 08/31/2006     Priority: Medium    Postmenopausal atrophic vaginitis 01/02/2006     Priority: Medium     started premarin vag cream 1/4 deandra @ HS twice a week Advised by GEORGES Crawford to use 1 Gm twice a week to prevent breakdownn of tissue over  nylon mesh from colopexy          Past Medical History:    Past Medical History:   Diagnosis  Date    Granulation tissue at vaginal vault 5/12/2010    Mixed hyperlipidemia     Squamous cell carcinoma     Unspecified essential hypertension        Past Surgical History:    Past Surgical History:   Procedure Laterality Date    COLONOSCOPY  7/10/07    RECTOCELE REPAIR  9/5/06    with prolift and colpopexy    SURGICAL HISTORY OF -       2001 cystocele and rectocele, birch procedure    SURGICAL HISTORY OF -   1/06    breast reduction    SURGICAL HISTORY OF -   1/3/08    Hemithyroidectomy Rt thyroid benign nodule    ZZC APPENDECTOMY      1958    ZZC VAGINAL HYSTERECTOMY      1997 total       Family History:    Family History   Problem Relation Age of Onset    Hypertension Mother     Lipids Mother     Cerebrovascular Disease Mother     Heart Disease Father         CHF @ age 99    Cancer Brother         brain - primary       Social History:  Marital Status:  Single [1]  Social History     Tobacco Use    Smoking status: Never    Smokeless tobacco: Never   Substance Use Topics    Alcohol use: No    Drug use: No        Medications:    atenolol (TENORMIN) 25 MG tablet  atorvastatin (LIPITOR) 40 MG tablet  cefdinir (OMNICEF) 300 MG capsule  hydrochlorothiazide (HYDRODIURIL) 25 MG tablet  levothyroxine (SYNTHROID/LEVOTHROID) 88 MCG tablet  predniSONE (DELTASONE) 20 MG tablet  ASPIRIN 81 MG PO TABS  GLUCOSAMINE CHONDRO COMPLEX OR  MULTIVITAMINS OR  STOOL SOFTENER OR        Review of Systems   Constitutional: Negative.    HENT: Negative.     Gastrointestinal: Negative.    Genitourinary: Negative.    Musculoskeletal:  Positive for back pain.   Skin: Negative.    Neurological: Negative.        Physical Exam   BP: (!) 143/78  Pulse: 78  Temp: 97.9  F (36.6  C)  Resp: 18  SpO2: 98 %      Physical Exam  Constitutional:       General: She is not in acute distress.     Appearance: Normal appearance. She is normal weight. She is not ill-appearing.   HENT:      Head: Normocephalic and atraumatic.   Cardiovascular:      Pulses: Normal  pulses.   Pulmonary:      Effort: Pulmonary effort is normal. No respiratory distress.      Breath sounds: Normal breath sounds. No stridor. No wheezing, rhonchi or rales.   Chest:      Chest wall: No tenderness.   Abdominal:      General: Abdomen is flat. Bowel sounds are normal. There is no distension.      Palpations: Abdomen is soft.      Tenderness: There is no abdominal tenderness. There is no right CVA tenderness, left CVA tenderness, guarding or rebound.   Musculoskeletal:         General: No swelling.      Cervical back: Normal range of motion and neck supple. No muscular tenderness.      Thoracic back: Tenderness present. No swelling, edema, deformity, signs of trauma, lacerations, spasms or bony tenderness. Normal range of motion. No scoliosis.      Lumbar back: Normal.   Skin:     General: Skin is warm and dry.      Capillary Refill: Capillary refill takes less than 2 seconds.   Neurological:      Mental Status: She is alert and oriented to person, place, and time.      Sensory: No sensory deficit.         ED Course                 Procedures            Results for orders placed or performed during the hospital encounter of 07/22/23 (from the past 24 hour(s))   XR Chest 2 Views    Narrative    EXAM: XR CHEST 2 VIEWS  LOCATION: [facility]  DATE: 7/22/2023    INDICATION: Left sided thoracic back pain  COMPARISON: None.      Impression    IMPRESSION: Mild thoracic curve. Aortic calcification. Chest otherwise negative. Lungs clear. No pleural effusion or pneumothorax. Normal heart size and pulmonary vascularity.   UA with Microscopic reflex to Culture    Specimen: Urine, Clean Catch   Result Value Ref Range    Color Urine Yellow Colorless, Straw, Light Yellow, Yellow    Appearance Urine Slightly Cloudy (A) Clear    Glucose Urine Negative Negative mg/dL    Bilirubin Urine Negative Negative    Ketones Urine Negative Negative mg/dL    Specific Gravity Urine 1.014 1.003 - 1.035     Blood Urine Small (A) Negative    pH Urine 6.0 5.0 - 7.0    Protein Albumin Urine Negative Negative mg/dL    Urobilinogen Urine Normal Normal, 2.0 mg/dL    Nitrite Urine Negative Negative    Leukocyte Esterase Urine Large (A) Negative    Bacteria Urine Few (A) None Seen /HPF    WBC Clumps Urine Present (A) None Seen /HPF    Mucus Urine Present (A) None Seen /LPF    RBC Urine 12 (H) <=2 /HPF    WBC Urine >182 (H) <=5 /HPF    Squamous Epithelials Urine 1 <=1 /HPF    Hyaline Casts Urine 4 (H) <=2 /LPF    Narrative    Urine Culture ordered based on laboratory criteria       Medications   acetaminophen (TYLENOL) tablet 975 mg (975 mg Oral $Given 7/22/23 1221)       Assessments & Plan (with Medical Decision Making)     The patient is an 88 year old female with a past medical history of of allergic rhinitis, acquired hypothyroidism, stage III chronic kidney disease, essential hypertension, squamous cell carcinoma, vaginal hysterectomy anxiety and back pain who presents for evaluation of acute onset left-sided thoracic back pain which began over the past 24 hours.  Denies rashes.  She was seen and evaluated for same concern in October 2022.    No acute cardiopulmonary concerns on chest x-ray today.  The patient's pain improved significantly after application of a lidocaine patch and Tylenol in clinic.  Recommend continuing these pain control measures at home.  May use a lidocaine patch once per day, only used for 12 hours during a 24-hour period.    Recommended prompt follow-up in the emergency department if back pain significantly worsens or does not respond to current pain measure controls.  Offered to transfer to the emergency department for further evaluation today but the patient declined.  Would like to manage her pain at home with continued use of acetaminophen, lidocaine patches and Celebrex.    Urinalysis is concerning for a urinary tract infection similar to what was noted previously when the patient was evaluated  for acute back pain in 10/2022, starting cefdinir today.  Urinalysis showed the presence of small blood, large leukocyte esterace, bacteria, WBC clumps, and significantly elevated WBCs as well as RBCs.  Vital signs are reassuring, does not meet SIRS criteria.    I am concerned about persistent arthralgias especially felt in the morning, symptoms are consistent with polymyalgia rheumatica.  Starting prednisone taper which she had had previously in December 2022.  Recommend close follow-up with primary care for further evaluation and management in the interim.  Primary care referral provided.  Rheumatology referral provided.    Return to clinic for further evaluation if you develop fever of 101 degrees F or greater, chest pain, difficulty breathing, palpitations, shortness of breath, difficulty swallowing, severe headache, urinary concerns, flank pain, rigors, worsening numbness or tingling or weakness, dizziness or lightheadedness, acute vision changes, acutely worsening rash, severe abdominal pain, or uncontrolled nausea/vomiting.    The patient verbalizes understanding and agrees with the above plan.  Discharged in stable condition.    I have reviewed the nursing notes.    I have reviewed the findings, diagnosis, plan and need for follow up with the patient.    Discharge Medication List as of 7/22/2023  1:27 PM        START taking these medications    Details   cefdinir (OMNICEF) 300 MG capsule Take 1 capsule (300 mg) by mouth 2 times daily for 10 days, Disp-20 capsule, R-0, E-Prescribe             Final diagnoses:   Acute left-sided thoracic back pain   Arthralgia - with elevated DELFINA   Abnormal urinalysis       7/22/2023   [facility] EMERGENCY DEPT       Demetri Luo PA-C  07/22/23 2073

## 2023-07-22 NOTE — DISCHARGE INSTRUCTIONS
No acute cardiopulmonary concerns on chest x-ray today.  The patient's pain improved significantly after application of a lidocaine patch and Tylenol in clinic.  Recommend continuing these pain control measures at home.  May use a lidocaine patch once per day, only used for 12 hours during a 24-hour period.    Return to clinic for further evaluation if you develop fever of 101 degrees F or greater, chest pain, difficulty breathing, palpitations, shortness of breath, difficulty swallowing, severe headache, urinary concerns, flank pain, rigors, worsening numbness or tingling or weakness, dizziness or lightheadedness, acute vision changes, acutely worsening rash, severe abdominal pain, or uncontrolled nausea/vomiting.

## 2023-07-24 LAB — BACTERIA UR CULT: NORMAL

## 2023-07-25 NOTE — RESULT ENCOUNTER NOTE
Final urine culture report is negative.  Adult Negative Urine culture parameters per protocol: Any # Urogenital single or mixed organism, <10,000 col/ml single organism (cath/midstream), and > 3 organisms (No susceptibilities performed).  [facility] Emergency Dept discharge antibiotic prescribed (If applicable): Cefdinir  Treatment recommendations per [facility] ED Lab Result Urine Culture protocol.

## 2024-03-05 ENCOUNTER — PATIENT OUTREACH (OUTPATIENT)
Dept: CARE COORDINATION | Facility: CLINIC | Age: 89
End: 2024-03-05
Payer: COMMERCIAL

## 2024-03-19 ENCOUNTER — PATIENT OUTREACH (OUTPATIENT)
Dept: CARE COORDINATION | Facility: CLINIC | Age: 89
End: 2024-03-19
Payer: COMMERCIAL

## 2024-06-23 ENCOUNTER — HEALTH MAINTENANCE LETTER (OUTPATIENT)
Age: 89
End: 2024-06-23